# Patient Record
Sex: MALE | Race: WHITE | NOT HISPANIC OR LATINO | Employment: FULL TIME | URBAN - METROPOLITAN AREA
[De-identification: names, ages, dates, MRNs, and addresses within clinical notes are randomized per-mention and may not be internally consistent; named-entity substitution may affect disease eponyms.]

---

## 2021-07-02 ENCOUNTER — OFFICE VISIT (OUTPATIENT)
Dept: FAMILY MEDICINE CLINIC | Facility: CLINIC | Age: 41
End: 2021-07-02

## 2021-07-02 VITALS
WEIGHT: 151 LBS | BODY MASS INDEX: 22.88 KG/M2 | OXYGEN SATURATION: 98 % | SYSTOLIC BLOOD PRESSURE: 128 MMHG | HEART RATE: 88 BPM | HEIGHT: 68 IN | DIASTOLIC BLOOD PRESSURE: 66 MMHG

## 2021-07-02 DIAGNOSIS — Z13.29 SCREENING FOR ENDOCRINE DISORDER: ICD-10-CM

## 2021-07-02 DIAGNOSIS — Z13.220 SCREENING FOR HYPERLIPIDEMIA: ICD-10-CM

## 2021-07-02 DIAGNOSIS — Z13.0 SCREENING FOR DEFICIENCY ANEMIA: ICD-10-CM

## 2021-07-02 DIAGNOSIS — Z00.00 WELL ADULT EXAM: Primary | ICD-10-CM

## 2021-07-02 PROCEDURE — 99385 PREV VISIT NEW AGE 18-39: CPT | Performed by: FAMILY MEDICINE

## 2021-07-02 RX ORDER — MULTIPLE VITAMINS W/ MINERALS TAB 9MG-400MCG
1 TAB ORAL DAILY
COMMUNITY

## 2021-07-02 NOTE — PROGRESS NOTES
Subjective   Tyrone Sanchez is a 41 y.o. male.     Chief Complaint   Patient presents with   • [facility]       History of Present Illness     Tyrone Sanchez presents today for annual physical exam and preventative care.    The patient denies recently having blood work completed or having any chronic issues. He states that he has been staying active.     He reports that he had a yeast infection on his penis that returned approximately 6 months later, which concerned him about possible diabetes or an STD. He reports that he had an STD screening that returned negative. He states that his grandfather passed away from pancreatic cancer and his grandmother had diabetes. He denies fatigue, weight gain, weight loss, increased urination, increased hunger, or increased thirst. He also denies having vision changes or significant swelling in his ankles. He denies having chest tightness or shortness of breath. He denies having any swollen lymph nodes, bruising, or constipation. He states that he has had hemorrhoids that inflame approximately 1 to 2 times yearly, which typically heal when he eats fiber. He reports that his father also has them.     He is allergic to CATS, PENICILLIN, and CERTAIN TYPES OF GRASS. He reports he has never had allergies until he moved to the area. He denies taking any allergy medication currently. He has recently tried 5 tablets of Allegra for congestion; however, it did not seem to help him. He states that he takes a multivitamin.     He is originally from [place], [place]. He smokes marijuana via a joint daily for purely recreational purposes. 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 He uses the electronic form of marijuana. His father has a history of melanoma and thyroid cancer. He denies having any sexual dysfunction. 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    He is employed as a  at [employer]. 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 He states that he shares custody with his 4-year-old daughter's mother and his daughter lives with him half of the time. He has 1 dog at home and denies going to Orthodoxy. He states that he does go to the golf course and he feels better when he walks while he plays.     This patient is accompanied by their self who contributes to the history of their care.    The following portions of the patient's history were reviewed and updated as appropriate: allergies, current medications, past family history, past medical history, past social history, past surgical history and problem list.    Active Ambulatory Problems     Diagnosis Date Noted   • No Active Ambulatory Problems     Resolved Ambulatory Problems     Diagnosis Date Noted   • No Resolved Ambulatory Problems     No Additional Past Medical History     History reviewed. No pertinent surgical history.  Family History   Problem Relation Age of Onset   • Other Father    • Diabetes Maternal Grandfather    • Cancer Paternal Grandfather      Social History     Socioeconomic History   • Marital status: Single     Spouse name: Not on file   • Number of children: Not on file   • Years of education: Not on file   • Highest education level: Not on file   Tobacco Use   • Smoking status: Former Smoker     Quit date: 2017     Years since quittin.0   • Smokeless tobacco: Never Used   Vaping Use   • Vaping Use: Some days   • Substances: Nicotine   • Devices: Refillable tank   Substance and Sexual Activity   • Alcohol use: Yes     Alcohol/week: 2.0 standard drinks     Types: 2 Cans of beer per week     Comment: week   • Drug use: Yes     Types: Marijuana   • Sexual activity: Yes       Review of Systems   Constitutional: Negative.    HENT: Negative.    Eyes: Negative for visual disturbance.   Respiratory: Negative.    Cardiovascular: Negative.    Gastrointestinal:        Rare hemorrhoids   Endocrine: Negative for polydipsia,  "polyphagia and polyuria.   Genitourinary:        Recent yeast infection   Musculoskeletal: Negative.    Allergic/Immunologic: Positive for environmental allergies. Negative for food allergies.        Cats, certain types of grass   Hematological: Negative for adenopathy. Does not bruise/bleed easily.       Objective   Blood pressure 128/66, pulse 88, height 173.4 cm (68.25\"), weight 68.5 kg (151 lb), SpO2 98 %.  Nursing note reviewed  Physical Exam  HENT:      Ears:      Comments: Significant fluid behind his bilat eardrum      General: Patient is well-nourished, well-developed, and in no acute distress.  HEENT: Normocephalic with no contusions noted, no ptosis or palsy. Pupils equally round and reactive to light, extraocular movements intact. Patient holds steady gaze, can follow to midline. Hearing grossly normal without deficit, exterior auricles normal, tympanic membranes normal without erythema or bulging.  Lymphatic: Posterior auricular, cervical, submandibular, supraclavicular, axillary lymphatic sites palpated without abnormality  Cardiovascular: Normal study rate without ectopy. PMI palpated, normal. Normal S1, S2. No murmurs rubs or gallops.  Respiratory: No tenderness to palpation on the chest wall, lungs clear to auscultation bilaterally, no wheezes, rales, or rhonchi. No pleural friction rubs.  Gastrointestinal: Bowel sounds present, normoactive globally. No bruit noted. Nontender, nondistended. Normal percussive exam, no hepatomegaly, no splenomegaly. No hernias, scars, gross lesions.  Extremities: No cyanosis or edema, 2+ pulses bilaterally, reflexes normal. Capillary refill time normal.  MSK: Normal gait and station. 5/5 strength globally.  Neuro: Cranial nerves II-XII grossly intact. Patient alert and oriented x3.  PHQ-9 Depression Screening  Little interest or pleasure in doing things? 0   Feeling down, depressed, or hopeless? 0   Trouble falling or staying asleep, or sleeping too much?     Feeling " tired or having little energy?     Poor appetite or overeating?     Feeling bad about yourself - or that you are a failure or have let yourself or your family down?     Trouble concentrating on things, such as reading the newspaper or watching television?     Moving or speaking so slowly that other people could have noticed? Or the opposite - being so fidgety or restless that you have been moving around a lot more than usual?     Thoughts that you would be better off dead, or of hurting yourself in some way?     PHQ-9 Total Score 0   If you checked off any problems, how difficult have these problems made it for you to do your work, take care of things at home, or get along with other people?       Procedures  Assessment/Plan   Problem List Items Addressed This Visit     None      Visit Diagnoses     Well adult exam    -  Primary        1. Well adult exam.  - He will get fasting blood work at his [facility]. He does have a history of HSV-1; however, he denies having any cold sores. I informed him to contact me if he does have an outbreak of cold sores or another yeast infection and we will treat it accordingly. I recommended Flonase secondary to his allergies and the fluid buildup behind his eardrum. He will follow up in 1 year, or sooner if needed. I also recommended for him to decrease his smoke inhalation while utilizing marijuana.     See patient diagnoses and orders along with patient instructions for assessment, plan, and changes to care for patient.    The preventative exam has been reviewed in detail.  The patient has been fully counseled on preventative guidelines for vaccines, cancer screenings, and other health maintenance needs. The patient was counseled on maintaining a lifestyle to promote good health and to minimize chronic diseases.  The patient has been assisted with scheduling healthcare procedures for the coming year and given a written document outlining these recommendations. Age-appropriate  screening measures have been ordered for the patient today as indicated above.    There are no Patient Instructions on file for this visit.    Return in about 1 year (around 7/2/2022) for Annual.    Ambulatory progress note signed and attested to by Amari Edwards D.O.           Current Outpatient Medications:   •  CBD (cannabidiol) oral oil, Take  by mouth Daily., Disp: , Rfl:   •  multivitamin with minerals tablet tablet, Take 1 tablet by mouth Daily., Disp: , Rfl:        Scribed for Amari Edwards DO by Jorge L Melchor.  07/02/21   11:47 EDT    I have personally performed the services described in this document as scribed by the above individual, and it is both accurate and complete.  Amari Edwards DO  7/6/2021  09:40 EDT

## 2021-07-02 NOTE — PATIENT INSTRUCTIONS
Preventive Care 21-39 Years Old, Male  Preventive care refers to lifestyle choices and visits with your health care provider that can promote health and wellness. This includes:  · A yearly physical exam. This is also called an annual wellness visit.  · Regular dental and eye exams.  · Immunizations.  · Screening for certain conditions.  · Healthy lifestyle choices, such as:  ? Eating a healthy diet.  ? Getting regular exercise.  ? Not using drugs or products that contain nicotine and tobacco.  ? Limiting alcohol use.  What can I expect for my preventive care visit?  Physical exam  Your health care provider may check your:  · Height and weight. These may be used to calculate your BMI (body mass index). BMI is a measurement that tells if you are at a healthy weight.  · Heart rate and blood pressure.  · Body temperature.  · Skin for abnormal spots.  Counseling  Your health care provider may ask you questions about your:  · Past medical problems.  · Family's medical history.  · Alcohol, tobacco, and drug use.  · Emotional well-being.  · Home life and relationship well-being.  · Sexual activity.  · Diet, exercise, and sleep habits.  · Work and work environment.  · Access to firearms.  What immunizations do I need?    Vaccines are usually given at various ages, according to a schedule. Your health care provider will recommend vaccines for you based on your age, medical history, and lifestyle or other factors, such as travel or where you work.  What tests do I need?  Blood tests  · Lipid and cholesterol levels. These may be checked every 5 years starting at age 20.  · Hepatitis C test.  · Hepatitis B test.  Screening    · Diabetes screening. This is done by checking your blood sugar (glucose) after you have not eaten for a while (fasting).  · Genital exam to check for testicular cancer or hernias.  · STD (sexually transmitted disease) testing, if you are at risk.  Talk with your health care provider about your test  results, treatment options, and if necessary, the need for more tests.  Follow these instructions at home:  Eating and drinking    · Eat a healthy diet that includes fresh fruits and vegetables, whole grains, lean protein, and low-fat dairy products.  · Drink enough fluid to keep your urine pale yellow.  · Take vitamin and mineral supplements as recommended by your health care provider.  · Do not drink alcohol if your health care provider tells you not to drink.  · If you drink alcohol:  ? Limit how much you have to 0-2 drinks a day.  ? Be aware of how much alcohol is in your drink. In the [place] one drink equals one 12 oz bottle of beer (355 mL), one 5 oz glass of wine (148 mL), or one 1½ oz glass of hard liquor (44 mL).  Lifestyle  · Take daily care of your teeth and gums. Brush your teeth every morning and night with fluoride toothpaste. Floss one time each day.  · Stay active. Exercise for at least 30 minutes 5 or more days each week.  · Do not use any products that contain nicotine or tobacco, such as cigarettes, e-cigarettes, and chewing tobacco. If you need help quitting, ask your health care provider.  · Do not use drugs.  · If you are sexually active, practice safe sex. Use a condom or other form of protection to prevent STIs (sexually transmitted infections).  · Find healthy ways to cope with stress, such as:  ? Meditation, yoga, or listening to music.  ? Journaling.  ? Talking to a trusted person.  ? Spending time with friends and family.  Safety  · Always wear your seat belt while driving or riding in a vehicle.  · Do not drive:  ? If you have been drinking alcohol. Do not ride with someone who has been drinking.  ? When you are tired or distracted.  ? While texting.  · Wear a helmet and other protective equipment during sports activities.  · If you have firearms in your house, make sure you follow all gun safety procedures.  · Seek help if you have been physically or sexually abused.  What's next?  · Go  to your health care provider once a year for an annual wellness visit.  · Ask your health care provider how often you should have your eyes and teeth checked.  · Stay up to date on all vaccines.  This information is not intended to replace advice given to you by your health care provider. Make sure you discuss any questions you have with your health care provider.  Document Revised: 09/02/2020 Document Reviewed: 12/12/2019  [employer] Patient Education © 2021 [employer]

## 2021-09-02 ENCOUNTER — LAB (OUTPATIENT)
Dept: LAB | Facility: HOSPITAL | Age: 41
End: 2021-09-02

## 2021-09-02 ENCOUNTER — TELEPHONE (OUTPATIENT)
Dept: FAMILY MEDICINE CLINIC | Facility: CLINIC | Age: 41
End: 2021-09-02

## 2021-09-02 DIAGNOSIS — Z00.00 WELL ADULT EXAM: ICD-10-CM

## 2021-09-02 LAB
APPEARANCE UR: CLEAR
BILIRUB UR QL STRIP: NEGATIVE
COLOR UR: YELLOW
GLUCOSE UR QL: NEGATIVE
HGB UR QL STRIP: NEGATIVE
KETONES UR QL STRIP: NEGATIVE
LEUKOCYTE ESTERASE UR QL STRIP: NEGATIVE
NITRITE UR QL STRIP: NEGATIVE
PH UR STRIP: 6.5 [PH] (ref 5–8)
PROT UR QL STRIP: NEGATIVE
SP GR UR: 1.03 (ref 1–1.03)
UROBILINOGEN UR STRIP-MCNC: NORMAL MG/DL

## 2021-09-02 NOTE — TELEPHONE ENCOUNTER
Caller: Tyrone Sanchez    Relationship: Self    Best call back number: [phone]    What is the best time to reach you: ANYTIME    Who are you requesting to speak with (clinical staff, provider,  specific staff member): CLINICAL STAFF    What was the call regarding: PATIENT STATES THAT HE HAD LABS DONE TODAY. HE DOES NOT KNOW WHAT ALL HE IS BEING TESTED FOR AND WOULD LIKE TO HAVE SOMEONE GIVE HIM A CALL BACK WITH THAT INFORMATION.     Do you require a callback: YES

## 2021-09-02 NOTE — TELEPHONE ENCOUNTER
Attempted to contact, no answer left detailed message relaying which labs were checked and briefly summarized and advised that we will follow up once his labs are reviewed.  Advised him to call back with any questions.

## 2021-09-03 LAB
ALBUMIN SERPL-MCNC: 4.6 G/DL (ref 3.5–5.2)
ALBUMIN/GLOB SERPL: 2 G/DL
ALP SERPL-CCNC: 49 U/L (ref 39–117)
ALT SERPL-CCNC: 23 U/L (ref 1–41)
AST SERPL-CCNC: 23 U/L (ref 1–40)
BILIRUB SERPL-MCNC: 0.3 MG/DL (ref 0–1.2)
BUN SERPL-MCNC: 24 MG/DL (ref 6–20)
BUN/CREAT SERPL: 22.4 (ref 7–25)
CALCIUM SERPL-MCNC: 9.8 MG/DL (ref 8.6–10.5)
CHLORIDE SERPL-SCNC: 107 MMOL/L (ref 98–107)
CHOLEST SERPL-MCNC: 184 MG/DL (ref 0–200)
CO2 SERPL-SCNC: 25.2 MMOL/L (ref 22–29)
CREAT SERPL-MCNC: 1.07 MG/DL (ref 0.76–1.27)
ERYTHROCYTE [DISTWIDTH] IN BLOOD BY AUTOMATED COUNT: 11.8 % (ref 12.3–15.4)
GLOBULIN SER CALC-MCNC: 2.3 GM/DL
GLUCOSE SERPL-MCNC: 99 MG/DL (ref 65–99)
HBA1C MFR BLD: 5.3 % (ref 4.8–5.6)
HCT VFR BLD AUTO: 43.6 % (ref 37.5–51)
HDLC SERPL-MCNC: 52 MG/DL (ref 40–60)
HGB BLD-MCNC: 14.8 G/DL (ref 13–17.7)
LDLC SERPL CALC-MCNC: 124 MG/DL (ref 0–100)
MCH RBC QN AUTO: 29.8 PG (ref 26.6–33)
MCHC RBC AUTO-ENTMCNC: 33.9 G/DL (ref 31.5–35.7)
MCV RBC AUTO: 87.7 FL (ref 79–97)
PLATELET # BLD AUTO: 252 10*3/MM3 (ref 140–450)
POTASSIUM SERPL-SCNC: 5 MMOL/L (ref 3.5–5.2)
PROT SERPL-MCNC: 6.9 G/DL (ref 6–8.5)
RBC # BLD AUTO: 4.97 10*6/MM3 (ref 4.14–5.8)
SODIUM SERPL-SCNC: 143 MMOL/L (ref 136–145)
TRIGL SERPL-MCNC: 40 MG/DL (ref 0–150)
TSH SERPL DL<=0.005 MIU/L-ACNC: 1.06 UIU/ML (ref 0.27–4.2)
VLDLC SERPL CALC-MCNC: 8 MG/DL (ref 5–40)
WBC # BLD AUTO: 7.21 10*3/MM3 (ref 3.4–10.8)

## 2021-09-09 ENCOUNTER — TELEPHONE (OUTPATIENT)
Dept: FAMILY MEDICINE CLINIC | Facility: CLINIC | Age: 41
End: 2021-09-09

## 2021-09-09 NOTE — TELEPHONE ENCOUNTER
Caller: SanchezTyrone    Relationship: Self    Best call back number: [phone] (H)    Caller requesting test results: PATIENT    What test was performed: BLOOD WORK    When was the test performed: 9/2/21    Where was the test performed:   [facility]    Additional notes: PATIENT CALLED TO OBTAIN THE RESULTS OF HIS BLOOD WORK.       PATIENT HAS BEEN ADVISED TO PLEASE ALLOW 48 HOURS FOR OUR CLINICAL TEAM WILL FOLLOW UP ON THIS REQUEST.

## 2021-09-22 NOTE — TELEPHONE ENCOUNTER
Called results to patient. He declined a letter with physical copy of results at this time. His LDL is slightly high at 124 but not indicated for medication at this time. His urine and BUN suggest he should increase his water intake but otherwise his results were unremarkable. He will work on diet and exercise and water intake and follow up next year for his annual.

## 2021-10-15 ENCOUNTER — OFFICE VISIT (OUTPATIENT)
Dept: FAMILY MEDICINE CLINIC | Facility: CLINIC | Age: 41
End: 2021-10-15

## 2021-10-15 ENCOUNTER — HOSPITAL ENCOUNTER (OUTPATIENT)
Dept: RADIOLOGY | Facility: CLINIC | Age: 41
Discharge: HOME | End: 2021-10-15

## 2021-10-15 VITALS
TEMPERATURE: 97.1 F | HEART RATE: 98 BPM | WEIGHT: 152.6 LBS | BODY MASS INDEX: 23.13 KG/M2 | SYSTOLIC BLOOD PRESSURE: 108 MMHG | OXYGEN SATURATION: 94 % | HEIGHT: 68 IN | RESPIRATION RATE: 18 BRPM | DIASTOLIC BLOOD PRESSURE: 62 MMHG

## 2021-10-15 DIAGNOSIS — S61.451A DOG BITE OF RIGHT HAND, INITIAL ENCOUNTER: ICD-10-CM

## 2021-10-15 DIAGNOSIS — M20.011 MALLET FINGER OF RIGHT HAND: Primary | ICD-10-CM
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
## 2021-10-15 DIAGNOSIS — S63.296A DISLOCATION OF DISTAL INTERPHALANGEAL (DIP) JOINT OF RIGHT LITTLE FINGER, INITIAL ENCOUNTER: ICD-10-CM

## 2021-10-15 PROCEDURE — 73130 X-RAY EXAM OF HAND: CPT | Performed by: FAMILY MEDICINE

## 2021-10-15 PROCEDURE — 90715 TDAP VACCINE 7 YRS/> IM: CPT | Performed by: FAMILY MEDICINE

## 2021-10-15 PROCEDURE — 90471 IMMUNIZATION ADMIN: CPT | Performed by: FAMILY MEDICINE

## 2021-10-15 PROCEDURE — 99215 OFFICE O/P EST HI 40 MIN: CPT | Performed by: FAMILY MEDICINE

## 2021-10-15 PROCEDURE — 99417 PROLNG OP E/M EACH 15 MIN: CPT | Performed by: FAMILY MEDICINE

## 2021-10-15 RX ORDER — CLINDAMYCIN HYDROCHLORIDE 300 MG/1
300 CAPSULE ORAL 3 TIMES DAILY
Qty: 21 CAPSULE | Refills: 0 | Status: SHIPPED | OUTPATIENT
Start: 2021-10-15 | End: 2021-10-22

## 2021-10-15 NOTE — PROGRESS NOTES
Subjective   Tyrone Sanchez is a 41 y.o. male.     Chief Complaint   Patient presents with   • Hand Pain     breaking up dog fight       History of Present Illness     Tyrone Sanchez presents today for   Chief Complaint   Patient presents with   • Hand Pain     breaking up dog fight     The patient presents today for an acute same day visit regarding hand pain.    He states late last night he broke up a dog fight and his little finger was punctured. The patient denies any pain. He is able to bend his finger, but he is unable to straighten it. He has not had any x-rays yet.    This patient is accompanied by their self who contributes to the history of their care.    The following portions of the patient's history were reviewed and updated as appropriate: allergies, current medications, past family history, past medical history, past social history, past surgical history and problem list.    Active Ambulatory Problems     Diagnosis Date Noted   • No Active Ambulatory Problems     Resolved Ambulatory Problems     Diagnosis Date Noted   • No Resolved Ambulatory Problems     Past Medical History:   Diagnosis Date   • Herpes simplex antibody positive      History reviewed. No pertinent surgical history.  Family History   Problem Relation Age of Onset   • Other Father    • Diabetes Maternal Grandfather    • Cancer Paternal Grandfather      Social History     Socioeconomic History   • Marital status: Single   Tobacco Use   • Smoking status: Former Smoker     Quit date: 2017     Years since quittin.3   • Smokeless tobacco: Never Used   Vaping Use   • Vaping Use: Some days   • Substances: Nicotine   • Devices: Refillable tank   • Passive vaping exposure: Yes   Substance and Sexual Activity   • Alcohol use: Yes     Alcohol/week: 2.0 standard drinks     Types: 2 Cans of beer per week     Comment: week   • Drug use: Yes     Types: Marijuana   • Sexual activity: Yes       Review of Systems  See HPI    Objective   Blood  "pressure 108/62, pulse 98, temperature 97.1 °F (36.2 °C), resp. rate 18, height 173.4 cm (68.27\"), weight 69.2 kg (152 lb 9.6 oz), SpO2 94 %.  Nursing note reviewed  Physical Exam  Musculoskeletal:      Comments: There is a 30-degree radial angulation at the DIP of the right 5th digit and it is stuck in approximately 60 degrees of flexion. Passive extension does not elicit any pain, but he has no active extension. There is a deformity at the dorsal PIP over the 5th digit.       Const: NAD, A&Ox4, Pleasant, Cooperative  Eyes: EOMI, no conjunctivitis  ENT: No nasal discharge present, neck supple  Cardiac: Regular rate and rhythm, no cyanosis  Resp: Respiratory rate within normal limits, no increased work of breathing, no audible wheezing or retractions noted  GI: No distention or ascites  MSK: Motor and sensation grossly intact in bilateral upper extremities  Neurologic: CN II-XII grossly intact  Psych: Appropriate mood and behavior.  Skin: Warm, dry  Procedures   XR Hand 3+ View Right    Result Date: 10/16/2021  Flexion deformity of the fifth DIP joint as described, and slightly irregular appearance of the trabecular pattern of the fifth middle phalanx. No definite fracture line. No evidence of foreign body.   D:  10/15/2021 E:  10/15/2021  This report was finalized on 10/16/2021 10:31 PM by Dr. Jonah Foley MD.      Assessment/Plan   Problem List Items Addressed This Visit     None      Visit Diagnoses     Mallet finger of right hand    -  Primary    No fracture evident. Splinted in DIP extension. Will follow up with hand surgery.    Relevant Orders    XR Hand 3+ View Right (Completed)    Ambulatory Referral to Hand Surgery    Dog bite of right hand, initial encounter        Tdap given today. No cellulitis evident on exam today, no wound closure indicated especially since >12 hours old, but would recommend abx prophylaxis.    Relevant Medications    clindamycin (Cleocin) 300 MG capsule    Other Relevant Orders    Tdap " Vaccine Greater Than or Equal To 8yo IM (Completed)          1. Dislocation of the right 5th DIP  -  It happened last night while trying to break up a dog fight. He has no pain on exam today, but severe deformity. He will have an x-ray and further management pending for results of this.    Patient Instructions   1.  Wear brace for 6 weeks, at least until your follow up with orthopedics.    2.  Avoid use of the right hand when possible.      Return in about 2 weeks (around 10/29/2021) for or with hand surgery.    Counseling was given to patient and family for the following topics: diagnostic results, instructions for management, patient and family education, impressions and importance of treatment compliance . Total time of the encounter including pre-visit review, history, physical, counseling, , care coordination, and documentation on day of service was 55 minutes.    Ambulatory progress note signed and attested to by Amari Edwards D.O.         Transcribed from ambient dictation for Amari Edwards DO by Samira Correa.  10/15/21   15:20 EDT    I have personally performed the services described in this document as transcribed by the above individual, and it is both accurate and complete.  Amari Edwards DO  10/20/2021  15:18 EDT

## 2021-10-15 NOTE — PATIENT INSTRUCTIONS
1.  Wear brace for 6 weeks, at least until your follow up with orthopedics.    2.  Avoid use of the right hand when possible.

## 2021-10-26 ENCOUNTER — TELEPHONE (OUTPATIENT)
Dept: FAMILY MEDICINE CLINIC | Facility: CLINIC | Age: 41
End: 2021-10-26

## 2021-10-26 DIAGNOSIS — M20.011 MALLET FINGER OF RIGHT HAND: Primary | ICD-10-CM

## 2021-10-26 NOTE — TELEPHONE ENCOUNTER
Called pt. Stated he isnt scheduled to see a Hand surgeon until the 8th of Nov. Stated he got the impression that he needs to be seen more urgently than this. Stated he also has noticed his hand is starting to ache and it wasn't doing this before. States he has heard good things about [facility] however he doesn't care, just wants whomever has the soonest appt date.

## 2021-10-26 NOTE — TELEPHONE ENCOUNTER
Caller: Tyrone Sanchez    Relationship: Self    Best call back number:[phone]    What is the best time to reach you: ANYTIME    Who are you requesting to speak with (clinical staff, provider,  specific staff member): DR. BASS    What was the call regarding: PATIENT STATES THAT HE IS NOT ABLE TO GET IN FOR HAND SURGERY UNTIL November 8 AND WOULD LIKE DISCUSS A NEW REFERRAL    Do you require a callback: YES

## 2021-10-26 NOTE — TELEPHONE ENCOUNTER
As long as patient is wearing the splint at all times, extremely urgent followup is not needed but I will try to get him into [facility] sooner.

## 2021-10-26 NOTE — TELEPHONE ENCOUNTER
Called and informed pt of providers orders as noted below. Pt voiced understanding. Had no further questions/concerns at this time.

## 2021-11-01 ENCOUNTER — TELEPHONE (OUTPATIENT)
Dept: FAMILY MEDICINE CLINIC | Facility: CLINIC | Age: 41
End: 2021-11-01

## 2021-11-01 NOTE — TELEPHONE ENCOUNTER
PATIENT IS REQUESTING IF HE CAN GET A REPLACEMENT SPLINT MADE FOR HIS PINKY FINGER. PATIENT STATES PADDING IS WORN OUT OF PREVIOUS SPLINT.  PREVIOUS SPLINT WAS MADE BY DR. BASS.    PLEASE ADVISE    CALL BACK NUMBER [phone]

## 2021-11-01 NOTE — TELEPHONE ENCOUNTER
I will forward this request to Dr. Edwards so that he can review it. He will be back in the office tomorrow.

## 2021-11-02 NOTE — TELEPHONE ENCOUNTER
My message was intended for the pool for a replacement splint. Yes, he can come in for a replacement. See above when he does.

## 2021-11-02 NOTE — TELEPHONE ENCOUNTER
Called and spoke to pt. Informed of providers orders as noted below. Pt verbalized good understanding. Stated he really needs another splint because the padding has worn out of his. Stated he is being careful and dressing it/changing it out but after 3 weeks it has worn down and the padding is almost gone. Stated he doesn't mind to cut it to size, just needs another one because he's worn this one out.

## 2021-11-02 NOTE — TELEPHONE ENCOUNTER
It's a regular aluminum splint I just cut it to size. It is imperative when replacing the splint the his finger not bend (brace on counter edge, remove, replace underneath, then tape)

## 2021-11-03 NOTE — TELEPHONE ENCOUNTER
Spoke with pt's PCP regarding the splint. Splint is on my desk with pt's information attached. Notified patient that he can pick it up. Pt states he will be in tomorrow.

## 2021-12-08 ENCOUNTER — OFFICE VISIT (OUTPATIENT)
Dept: FAMILY MEDICINE CLINIC | Facility: CLINIC | Age: 41
End: 2021-12-08

## 2021-12-08 VITALS
HEIGHT: 68 IN | OXYGEN SATURATION: 96 % | HEART RATE: 84 BPM | DIASTOLIC BLOOD PRESSURE: 72 MMHG | RESPIRATION RATE: 18 BRPM | BODY MASS INDEX: 24.46 KG/M2 | WEIGHT: 161.4 LBS | SYSTOLIC BLOOD PRESSURE: 112 MMHG

## 2021-12-08 DIAGNOSIS — H00.015 HORDEOLUM EXTERNUM OF LEFT LOWER EYELID: Primary | ICD-10-CM

## 2021-12-08 PROCEDURE — 99213 OFFICE O/P EST LOW 20 MIN: CPT | Performed by: FAMILY MEDICINE

## 2021-12-08 RX ORDER — SULFACETAMIDE SODIUM 100 MG/ML
1 SOLUTION/ DROPS OPHTHALMIC
Qty: 5 ML | Refills: 0 | Status: SHIPPED | OUTPATIENT
Start: 2021-12-08 | End: 2023-01-09

## 2021-12-08 NOTE — PROGRESS NOTES
Subjective   Tyrone Sanchez is a 41 y.o. male.     Chief Complaint   Patient presents with   • stai on eye     recurrent       History of Present Illness     Tyrone Sanchez presents today for   Chief Complaint   Patient presents with   • stai on eye     recurrent     The patient reports a reoccurring lesion on the edge of his left eyelid, gradually grew in size and then popped on its own. He tried warm compresses, and cleaning his eyelids at night with saline solution. He reports that the lesion has returned 3 times and twice within the last 6 months. He does rub his eyes frequently during allergy season. He denies wearing contacts.     He reports that he does scratch his eyes a lot during the allergy season.    This patient is accompanied by their self who contributes to the history of their care.    The following portions of the patient's history were reviewed and updated as appropriate: allergies, current medications, past family history, past medical history, past social history, past surgical history and problem list.    Active Ambulatory Problems     Diagnosis Date Noted   • No Active Ambulatory Problems     Resolved Ambulatory Problems     Diagnosis Date Noted   • No Resolved Ambulatory Problems     Past Medical History:   Diagnosis Date   • Herpes simplex antibody positive      History reviewed. No pertinent surgical history.  Family History   Problem Relation Age of Onset   • Other Father    • Diabetes Maternal Grandfather    • Cancer Paternal Grandfather      Social History     Socioeconomic History   • Marital status: Single   Tobacco Use   • Smoking status: Former Smoker     Packs/day: 1.00     Years: 20.00     Pack years: 20.00     Start date:      Quit date: 2017     Years since quittin.4   • Smokeless tobacco: Never Used   Vaping Use   • Vaping Use: Some days   • Substances: Nicotine   • Devices: Refillable tank   • Passive vaping exposure: Yes   Substance and Sexual Activity   • Alcohol use:  "Yes     Alcohol/week: 2.0 standard drinks     Types: 2 Cans of beer per week     Comment: week   • Drug use: Yes     Types: Marijuana   • Sexual activity: Yes       Review of Systems  See HPI    Objective   Blood pressure 112/72, pulse 84, resp. rate 18, height 173.4 cm (68.27\"), weight 73.2 kg (161 lb 6.4 oz), SpO2 96 %.  Nursing note reviewed  Physical Exam  Const: NAD, A&Ox4, Pleasant, Cooperative  Eyes: EOMI, no conjunctivitis  ENT: No nasal discharge present, neck supple  Cardiac: Regular rate and rhythm, no cyanosis  Resp: Respiratory rate within normal limits, no increased work of breathing, no audible wheezing or retractions noted  GI: No distention or ascites  MSK: Motor and sensation grossly intact in bilateral upper extremities  Neurologic: CN II-XII grossly intact  Psych: Appropriate mood and behavior.  Skin: Warm, dry  Procedures  Assessment/Plan   Problem List Items Addressed This Visit     None      Visit Diagnoses     Hordeolum externum of left lower eyelid    -  Primary    Relevant Medications    sulfacetamide (Bleph-10) 10 % ophthalmic solution          - We discussed prevention and treatment. I recommended that he use allergy eye drops in the summer and late fall if he is having to itch his eyes a lot. I recommended that he wash his pillow cases once per week, wash his pillows once per month. He will use eye drops as needed at the first sign of recurrence, and let me know if it recurs more than twice in 6 months.    There are no Patient Instructions on file for this visit.    Return in about 1 year (around 12/8/2022) for Annual.    [facility]     Ambulatory progress note signed and attested to by Amari Edwards D.O.         Transcribed from ambient dictation for Amari Edwards,  by Caitie Lawton.  12/08/21   11:58 EST    Patient verbalized consent to the visit recording.  I have personally performed the services described in this document as transcribed by the above individual, and it is both " accurate and complete.  Amari Edwards DO  12/16/2021  14:40 EST

## 2023-01-09 ENCOUNTER — OFFICE VISIT (OUTPATIENT)
Dept: FAMILY MEDICINE CLINIC | Facility: CLINIC | Age: 43
End: 2023-01-09
Payer: MEDICAID

## 2023-01-09 VITALS
BODY MASS INDEX: 23.61 KG/M2 | TEMPERATURE: 97.8 F | OXYGEN SATURATION: 97 % | WEIGHT: 155.8 LBS | SYSTOLIC BLOOD PRESSURE: 110 MMHG | DIASTOLIC BLOOD PRESSURE: 80 MMHG | HEIGHT: 68 IN | HEART RATE: 98 BPM

## 2023-01-09 DIAGNOSIS — M54.50 ACUTE MIDLINE LOW BACK PAIN WITHOUT SCIATICA: Primary | ICD-10-CM

## 2023-01-09 PROCEDURE — 99213 OFFICE O/P EST LOW 20 MIN: CPT | Performed by: NURSE PRACTITIONER

## 2023-01-09 RX ORDER — CYCLOBENZAPRINE HCL 10 MG
10 TABLET ORAL 3 TIMES DAILY PRN
Qty: 90 TABLET | Refills: 0 | Status: SHIPPED | OUTPATIENT
Start: 2023-01-09

## 2023-01-09 RX ORDER — PREDNISONE 20 MG/1
40 TABLET ORAL DAILY
Qty: 14 TABLET | Refills: 0 | Status: SHIPPED | OUTPATIENT
Start: 2023-01-09

## 2023-01-09 NOTE — PROGRESS NOTES
Chief Complaint   Patient presents with   • Back Pain     Lower to Mid back.. Patient states he was golfing and swung the club to where it dung into the ground/dirt. Started 3 days ago        Subjective   Tyrone Sanchez is a 43 y.o. male    History of Present Illness  Patient today with complaints of back pain.  He was swinging a club at a indoor golf facility on this past Thursday and when he does again he developed excruciating low to mid back pain.  He has tried heat, Epson salts and ibuprofen 800 mg 3-4 times a day.  He has seen minimal help with his pain.  He denies any change in bowels or bladder.    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 No pertinent surgical history.  Social History     Socioeconomic History   • Marital status: Single   Tobacco Use   • Smoking status: Former     Packs/day: 1.00     Years: 20.00     Pack years: 20.00     Types: Cigarettes     Start date:      Quit date: 2017     Years since quittin.5   • Smokeless tobacco: Never   Vaping Use   • Vaping Use: Some days   • Substances: Nicotine   • Devices: Refillable tank   • Passive vaping exposure: Yes   Substance and Sexual Activity   • Alcohol use: Yes     Alcohol/week: 2.0 standard drinks     Types: 2 Cans of beer per week     Comment: week   • Drug use: Yes     Types: Marijuana   • Sexual activity: Yes        Current Outpatient Medications:   •  multivitamin with minerals tablet tablet, Take 1 tablet by mouth Daily., Disp: , Rfl:   •  cyclobenzaprine (FLEXERIL) 10 MG tablet, Take 1 tablet by mouth 3 (Three) Times a Day As Needed for Muscle Spasms., Disp: 90 tablet, Rfl: 0  •  predniSONE (DELTASONE) 20 MG tablet, Take 2 tablets by mouth Daily., Disp: 14 tablet, Rfl: 0     Review of Systems   Constitutional: Negative for chills, fatigue, fever and unexpected weight change.    Respiratory: Negative for cough, chest tightness, shortness of breath and wheezing.    Cardiovascular: Negative for chest pain, palpitations and leg swelling.   Gastrointestinal: Negative for constipation, diarrhea, nausea and vomiting.   Genitourinary: Negative for difficulty urinating and dysuria.   Musculoskeletal: Positive for back pain.   Skin: Negative for color change and rash.   Neurological: Negative for dizziness, syncope, weakness and headaches.   Psychiatric/Behavioral: Negative for sleep disturbance.       Objective     Vitals:    01/09/23 0937   BP: 110/80   Pulse: 98   Temp: 97.8 °F (36.6 °C)   SpO2: 97%         01/09/23 0937   Weight: 70.7 kg (155 lb 12.8 oz)     Body mass index is 23.5 kg/m².  Results for orders placed or performed in visit on 09/02/21   TSH    Specimen: Blood    BLOOD  RELEASE TO NIDIA   Result Value Ref Range    TSH 1.060 0.270 - 4.200 uIU/mL   Urinalysis With Microscopic If Indicated (No Culture) - Urine, Clean Catch    Specimen: Urine, Clean Catch    URINE  RELEASE TO NIDIA   Result Value Ref Range    Specific Gravity, UA 1.026 1.005 - 1.030    pH, UA 6.5 5.0 - 8.0    Color, UA Yellow     Appearance, UA Clear Clear    Leukocytes, UA Negative Negative    Protein Negative Negative    Glucose, UA Negative Negative    Ketones Negative Negative    Blood, UA Negative Negative    Bilirubin, UA Negative Negative    Urobilinogen, UA Comment     Nitrite, UA Negative Negative   Comprehensive Metabolic Panel    Specimen: Blood    BLOOD  RELEASE TO NIDIA   Result Value Ref Range    Glucose 99 65 - 99 mg/dL    BUN 24 (H) 6 - 20 mg/dL    Creatinine 1.07 0.76 - 1.27 mg/dL    eGFR Non African Am 77 >60 mL/min/1.73    eGFR African Am 93 >60 mL/min/1.73    BUN/Creatinine Ratio 22.4 7.0 - 25.0    Sodium 143 136 - 145 mmol/L    Potassium 5.0 3.5 - 5.2 mmol/L    Chloride 107 98 - 107 mmol/L    Total CO2 25.2 22.0 - 29.0 mmol/L    Calcium 9.8 8.6 - 10.5 mg/dL    Total Protein 6.9 6.0 - 8.5 g/dL     Albumin 4.60 3.50 - 5.20 g/dL    Globulin 2.3 gm/dL    A/G Ratio 2.0 g/dL    Total Bilirubin 0.3 0.0 - 1.2 mg/dL    Alkaline Phosphatase 49 39 - 117 U/L    AST (SGOT) 23 1 - 40 U/L    ALT (SGPT) 23 1 - 41 U/L   Hemoglobin A1c    Specimen: Blood    BLOOD  RELEASE TO NIDIA   Result Value Ref Range    Hemoglobin A1C 5.30 4.80 - 5.60 %   Lipid Panel    Specimen: Blood    BLOOD  RELEASE TO NIDIA   Result Value Ref Range    Total Cholesterol 184 0 - 200 mg/dL    Triglycerides 40 0 - 150 mg/dL    HDL Cholesterol 52 40 - 60 mg/dL    VLDL Cholesterol Yvon 8 5 - 40 mg/dL    LDL Chol Calc (NIH) 124 (H) 0 - 100 mg/dL   CBC (No Diff)    Specimen: Blood    BLOOD  RELEASE TO NIDIA   Result Value Ref Range    WBC 7.21 3.40 - 10.80 10*3/mm3    RBC 4.97 4.14 - 5.80 10*6/mm3    Hemoglobin 14.8 13.0 - 17.7 g/dL    Hematocrit 43.6 37.5 - 51.0 %    MCV 87.7 79.0 - 97.0 fL    MCH 29.8 26.6 - 33.0 pg    MCHC 33.9 31.5 - 35.7 g/dL    RDW 11.8 (L) 12.3 - 15.4 %    Platelets 252 140 - 450 10*3/mm3       Physical Exam  Vitals reviewed.   Constitutional:       Appearance: He is well-developed.   HENT:      Head: Normocephalic and atraumatic.   Cardiovascular:      Rate and Rhythm: Normal rate and regular rhythm.      Heart sounds: Normal heart sounds.   Pulmonary:      Effort: Pulmonary effort is normal.      Breath sounds: Normal breath sounds.   Musculoskeletal:         General: Tenderness (low back) present.   Skin:     General: Skin is warm and dry.   Neurological:      Mental Status: He is alert and oriented to person, place, and time.   Psychiatric:         Behavior: Behavior normal.         Assessment & Plan   Problems Addressed this Visit    None  Visit Diagnoses     Acute midline low back pain without sciatica    -  Primary    Relevant Medications    predniSONE (DELTASONE) 20 MG tablet    cyclobenzaprine (FLEXERIL) 10 MG tablet      Diagnoses       Codes Comments    Acute midline low back pain without sciatica    -  Primary ICD-10-CM:  M54.50  ICD-9-CM: 724.2       I instructed him to use prednisone 40 mg every morning for 1 week and not take ibuprofen during this time.  He is also use Flexeril 10 mg up to 3 times a day as needed and he said not do this and drive or drink alcohol.  Patient was encouraged to keep me informed of any acute changes, lack of improvement, or any new concerning symptoms.  If patient becomes concerned, or has any worsening symptoms, they are to report either here, urgent treatment, or emergency room. Plan of care discussed with pt. They verbalized understanding and agreement.     Time spent on visit, including counseling, education, reviewing the chart, and any recent test results, was   10     Minutes    Return visit in 2 weeks    Counseling provided on Back exercise.    Tom Felix, APRN   1/9/2023   10:25 EST     Please note that portions of this document were completed with a voice recognition program.     At [facility], we believe that sharing information builds trust and better relationships. You are receiving this note because you are receiving care at [facility] or have recently visited. It is possible you will see health information before a provider has talked with you about it. This kind of information can be easy to misunderstand. To help you fully understand what it means for your health, we urge you to discuss this note with your provider.

## 2023-08-24 ENCOUNTER — PRIOR AUTHORIZATION (OUTPATIENT)
Dept: FAMILY MEDICINE CLINIC | Facility: CLINIC | Age: 43
End: 2023-08-24

## 2023-08-24 ENCOUNTER — OFFICE VISIT (OUTPATIENT)
Dept: FAMILY MEDICINE CLINIC | Facility: CLINIC | Age: 43
End: 2023-08-24
Payer: MEDICAID

## 2023-08-24 ENCOUNTER — LAB (OUTPATIENT)
Dept: LAB | Facility: HOSPITAL | Age: 43
End: 2023-08-24
Payer: MEDICAID

## 2023-08-24 VITALS
SYSTOLIC BLOOD PRESSURE: 110 MMHG | DIASTOLIC BLOOD PRESSURE: 76 MMHG | BODY MASS INDEX: 22.13 KG/M2 | HEIGHT: 68 IN | OXYGEN SATURATION: 98 % | HEART RATE: 60 BPM | WEIGHT: 146 LBS

## 2023-08-24 DIAGNOSIS — Z13.29 SCREENING FOR ENDOCRINE DISORDER: ICD-10-CM

## 2023-08-24 DIAGNOSIS — Z13.220 SCREENING FOR HYPERLIPIDEMIA: ICD-10-CM

## 2023-08-24 DIAGNOSIS — Z51.81 THERAPEUTIC DRUG MONITORING: ICD-10-CM

## 2023-08-24 DIAGNOSIS — R10.84 GENERALIZED ABDOMINAL PAIN: Primary | ICD-10-CM

## 2023-08-24 LAB
ALBUMIN SERPL-MCNC: 4.8 G/DL (ref 3.5–5.2)
ALBUMIN/GLOB SERPL: 2.2 G/DL
ALP SERPL-CCNC: 55 U/L (ref 39–117)
ALT SERPL-CCNC: 14 U/L (ref 1–41)
APPEARANCE UR: CLEAR
AST SERPL-CCNC: 15 U/L (ref 1–40)
BILIRUB SERPL-MCNC: 0.3 MG/DL (ref 0–1.2)
BILIRUB UR QL STRIP: NEGATIVE
BUN SERPL-MCNC: 15 MG/DL (ref 6–20)
BUN/CREAT SERPL: 13.9 (ref 7–25)
CALCIUM SERPL-MCNC: 9.8 MG/DL (ref 8.6–10.5)
CHLORIDE SERPL-SCNC: 104 MMOL/L (ref 98–107)
CHOLEST SERPL-MCNC: 185 MG/DL (ref 0–200)
CO2 SERPL-SCNC: 26.7 MMOL/L (ref 22–29)
COLOR UR: YELLOW
CREAT SERPL-MCNC: 1.08 MG/DL (ref 0.76–1.27)
EGFRCR SERPLBLD CKD-EPI 2021: 88.4 ML/MIN/1.73
GLOBULIN SER CALC-MCNC: 2.2 GM/DL
GLUCOSE SERPL-MCNC: 106 MG/DL (ref 65–99)
GLUCOSE UR QL STRIP: NEGATIVE
HBA1C MFR BLD: 5.6 % (ref 4.8–5.6)
HDLC SERPL-MCNC: 47 MG/DL (ref 40–60)
HGB UR QL STRIP: NEGATIVE
KETONES UR QL STRIP: NEGATIVE
LDLC SERPL CALC-MCNC: 129 MG/DL (ref 0–100)
LEUKOCYTE ESTERASE UR QL STRIP: NEGATIVE
NITRITE UR QL STRIP: NEGATIVE
PH UR STRIP: 6 [PH] (ref 5–8)
POTASSIUM SERPL-SCNC: 5.4 MMOL/L (ref 3.5–5.2)
PROT SERPL-MCNC: 7 G/DL (ref 6–8.5)
PROT UR QL STRIP: NEGATIVE
SODIUM SERPL-SCNC: 141 MMOL/L (ref 136–145)
SP GR UR STRIP: 1.02 (ref 1–1.03)
TRIGL SERPL-MCNC: 49 MG/DL (ref 0–150)
TSH SERPL DL<=0.005 MIU/L-ACNC: 0.62 UIU/ML (ref 0.27–4.2)
UROBILINOGEN UR STRIP-MCNC: NORMAL MG/DL
VLDLC SERPL CALC-MCNC: 9 MG/DL (ref 5–40)

## 2023-08-24 RX ORDER — OMEPRAZOLE 40 MG/1
40 CAPSULE, DELAYED RELEASE ORAL 2 TIMES DAILY
Qty: 30 CAPSULE | Refills: 0 | Status: SHIPPED | OUTPATIENT
Start: 2023-08-24

## 2023-08-24 NOTE — PROGRESS NOTES
Established Patient Office Visit      Patient Name: Tyrone Sanchez  : 1980   MRN: [id number]   Care Team: Patient Care Team:  Amari Edwards DO as PCP - General (Family Medicine)    Chief Complaint:    Chief Complaint   Patient presents with    Abdominal Pain     Pain in the mornings and after meals. He states that his dad has IBS and he wants to get referred or see if he needs to be on a special diet.       History of Present Illness: Tyrone Sanchez is a 43 y.o. male who is here today for chief complaint.    HPI    This patient is accompanied by their self who contributes to the history of their care.    The following portions of the patient's history were reviewed and updated as appropriate: allergies, current medications, past family history, past medical history, past social history, past surgical history and problem list.    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    Family History:   Family History   Problem Relation Age of Onset    Other Father     Diabetes Maternal Grandfather     Cancer Paternal Grandfather        Social History:   Social History     Socioeconomic History    Marital status: Single   Tobacco Use    Smoking status: Former     Packs/day: 1.00     Years: 20.00     Pack years: 20.00     Types: Cigarettes     Start date:      Quit date: 2017     Years since quittin.1    Smokeless tobacco: Never   Vaping Use    Vaping Use: Some days    Substances: Nicotine    Devices: Refillable tank    Passive vaping exposure: Yes   Substance and Sexual Activity    Alcohol use: Yes     Alcohol/week: 2.0 standard drinks     Types: 2 Cans of beer per week     Comment: week    Drug use: Yes     Types: Marijuana    Sexual activity: Yes       Tobacco History:   Social History  "    Tobacco Use   Smoking Status Former    Packs/day: 1.00    Years: 20.00    Pack years: 20.00    Types: Cigarettes    Start date:     Quit date: 2017    Years since quittin.1   Smokeless Tobacco Never       Medications:     Current Outpatient Medications:     multivitamin with minerals tablet tablet, Take 1 tablet by mouth Daily., Disp: , Rfl:     omeprazole (priLOSEC) 40 MG capsule, Take 1 capsule by mouth 2 (Two) Times a Day., Disp: 30 capsule, Rfl: 0    Allergies:   Allergies   Allergen Reactions    Penicillins Anaphylaxis       Objective   Objective     Physical Exam:  Vital Signs:   Vitals:    23 0840   BP: 110/76   Pulse: 60   SpO2: 98%   Weight: 66.2 kg (146 lb)   Height: 173.4 cm (68.27\")     Body mass index is 22.03 kg/m².     Physical Exam  Nursing note reviewed  Const: NAD, A&Ox4, Pleasant, Cooperative  Eyes: EOMI, no conjunctivitis  ENT: No nasal discharge present, neck supple  Cardiac: Regular rate and rhythm, no cyanosis  Resp: Respiratory rate within normal limits, no increased work of breathing, no audible wheezing or retractions noted  GI: No distention or ascites. Firm mass right side  Procedures/Radiology     Procedures  No radiology results for the last 7 days     Assessment & Plan   Assessment / Plan      Assessment/Plan:   Problems Addressed This Visit  Diagnoses and all orders for this visit:    1. Generalized abdominal pain (Primary)  -     omeprazole (priLOSEC) 40 MG capsule; Take 1 capsule by mouth 2 (Two) Times a Day.  Dispense: 30 capsule; Refill: 0    2. Screening for hyperlipidemia  -     Lipid Panel; Future    3. Therapeutic drug monitoring    4. Screening for endocrine disorder  -     Hemoglobin A1c; Future  -     Comprehensive Metabolic Panel; Future  -     Urinalysis With Microscopic If Indicated (No Culture) - Urine, Clean Catch; Future  -     TSH; Future      Problem List Items Addressed This Visit    None  Visit Diagnoses       Generalized abdominal pain    -  " Primary    Relevant Medications    omeprazole (priLOSEC) 40 MG capsule    Screening for hyperlipidemia        Relevant Orders    Lipid Panel (Completed)    Therapeutic drug monitoring        Screening for endocrine disorder        Relevant Orders    Hemoglobin A1c (Completed)    Comprehensive Metabolic Panel (Completed)    Urinalysis With Microscopic If Indicated (No Culture) - Urine, Clean Catch (Completed)    TSH (Completed)          Empirical therapy is recommended I recommended high-dose proton pump inhibitor for 2 months to document complete resolution of symptoms with acid suppressive therapy.  After symptoms have been clearly eliminated with twice daily dosing of a proton pump inhibitor, omeprazole 40 mg p.o. twice daily, he will decrease this to once daily dosing and after a month of this will taper off.  Depending on how his symptoms recur following this, consideration of upper endoscopy to rule out erosive esophagitis or eosinophilic esophagitis I think would be in order.  I reassured him that high-dose proton pump inhibitor particularly in short-term is safe.     There are no Patient Instructions on file for this visit.    Follow Up:   No follow-ups on file.      DO ROSANNE Watts RD  [facility]  [id number]  [facility] [id number]  Fax [phone]  Phone [phone]

## 2023-08-25 NOTE — TELEPHONE ENCOUNTER
Approved on August 24  The request has been approved. The authorization is effective from 08/24/2023 to 08/23/2024, as long as the member is enrolled in their current health plan. The request was approved with a quantity restriction. This has been approved for a max daily dosage of 2. A written notification letter will follow with additional details.

## 2023-09-08 ENCOUNTER — TELEMEDICINE (OUTPATIENT)
Dept: FAMILY MEDICINE CLINIC | Facility: CLINIC | Age: 43
End: 2023-09-08
Payer: MEDICAID

## 2023-09-08 DIAGNOSIS — R10.84 GENERALIZED ABDOMINAL PAIN: Primary | ICD-10-CM

## 2023-09-08 PROCEDURE — 99213 OFFICE O/P EST LOW 20 MIN: CPT | Performed by: FAMILY MEDICINE

## 2023-09-25 NOTE — PROGRESS NOTES
Subjective   Tyrone Sanchez is a 43 y.o. male.     Chief Complaint   Patient presents with    Heartburn    Follow-up       History of Present Illness     Tyrone Sanchez presents today for   Chief Complaint   Patient presents with    Heartburn    Follow-up       Doing better. Lab review    You have chosen to receive care through a telehealth visit.  Do you consent to use a video/audio connection for your medical care today? Yes    Patient location: [address] [place], [place] [id number]   Provider Location: [address] [place] [address]    The following portions of the patient's history were reviewed and updated as appropriate: allergies, current medications, past family history, past medical history, past social history, past surgical history and problem list.    Active Ambulatory Problems     Diagnosis Date Noted    No Active Ambulatory Problems     Resolved Ambulatory Problems     Diagnosis Date Noted    No Resolved Ambulatory Problems     Past Medical History:   Diagnosis Date    Herpes simplex antibody positive      No past surgical history on file.  Family History   Problem Relation Age of Onset    Other Father     Diabetes Maternal Grandfather     Cancer Paternal Grandfather      Social History     Socioeconomic History    Marital status: Single   Tobacco Use    Smoking status: Former     Packs/day: 1.00     Years: 20.00     Pack years: 20.00     Types: Cigarettes     Start date:      Quit date: 2017     Years since quittin.2    Smokeless tobacco: Never   Vaping Use    Vaping Use: Some days    Substances: Nicotine    Devices: Refillable tank    Passive vaping exposure: Yes   Substance and Sexual Activity    Alcohol use: Yes     Alcohol/week: 2.0 standard drinks     Types: 2 Cans of beer per week     Comment: week    Drug use: Yes     Types: Marijuana    Sexual activity: Yes       Review of Systems  Review of Systems -  General ROS: negative for - chills, fever or night sweats  Cardiovascular  ROS: no chest pain or dyspnea on exertion  Gastrointestinal ROS: no abdominal pain, change in bowel habits, or black or bloody stools  Genito-Urinary ROS: no dysuria, trouble voiding, or hematuria    Objective   There were no vitals taken for this visit.  Vitals obtained from patient if available  Physical Exam  Const: Non-toxic appearing, NAD, A&Ox4, Pleasant, Cooperative  Eyes: EOMI, no conjunctivitis  ENT: No copious nasal drainage noted  Cardiac: Regular rate by pulse  Resp: Respiratory rate observed to be within normal limits, no increased work of breathing observed, no audible wheezing or cough noted  Psych: Appropriate mood and behavior.  Procedures  Assessment & Plan   Problem List Items Addressed This Visit    None  Visit Diagnoses       Generalized abdominal pain    -  Primary          Continue omeprazole    See patient diagnoses and orders along with patient instructions for assessment, plan, and changes to care for patient.    This visit was conducted via telemedicine with live video and audio provided through Video Options: MyChart/Zoom at the point of care.    There are no Patient Instructions on file for this visit.    No follow-ups on file.    Ambulatory progress note signed and attested to by Amari Edwards D.O.

## 2023-10-09 DIAGNOSIS — R10.84 GENERALIZED ABDOMINAL PAIN: ICD-10-CM

## 2023-10-09 RX ORDER — OMEPRAZOLE 40 MG/1
40 CAPSULE, DELAYED RELEASE ORAL 2 TIMES DAILY
Qty: 30 CAPSULE | Refills: 0 | Status: SHIPPED | OUTPATIENT
Start: 2023-10-09

## 2024-01-25 ENCOUNTER — OFFICE VISIT (OUTPATIENT)
Dept: FAMILY MEDICINE CLINIC | Facility: CLINIC | Age: 44
End: 2024-01-25
Payer: MEDICAID

## 2024-01-25 VITALS
HEIGHT: 68 IN | WEIGHT: 142.8 LBS | BODY MASS INDEX: 21.64 KG/M2 | SYSTOLIC BLOOD PRESSURE: 102 MMHG | DIASTOLIC BLOOD PRESSURE: 68 MMHG

## 2024-01-25 DIAGNOSIS — K03.6 DENTAL PLAQUE: ICD-10-CM

## 2024-01-25 DIAGNOSIS — F12.93 CANNABIS WITHDRAWAL: Primary | ICD-10-CM

## 2024-01-25 DIAGNOSIS — H93.12 TINNITUS OF LEFT EAR: ICD-10-CM

## 2024-01-25 DIAGNOSIS — R09.81 NASAL CONGESTION: ICD-10-CM

## 2024-01-25 PROCEDURE — 99214 OFFICE O/P EST MOD 30 MIN: CPT | Performed by: FAMILY MEDICINE

## 2024-01-25 RX ORDER — MIRTAZAPINE 15 MG/1
15 TABLET, FILM COATED ORAL NIGHTLY
Qty: 30 TABLET | Refills: 2 | Status: SHIPPED | OUTPATIENT
Start: 2024-01-25

## 2024-01-25 RX ORDER — FLUTICASONE PROPIONATE 50 MCG
1 SPRAY, SUSPENSION (ML) NASAL DAILY
Qty: 16 G | Refills: 11 | Status: SHIPPED | OUTPATIENT
Start: 2024-01-25

## 2024-01-25 RX ORDER — BUSPIRONE HYDROCHLORIDE 10 MG/1
10 TABLET ORAL 2 TIMES DAILY PRN
Qty: 60 TABLET | Refills: 0 | Status: SHIPPED | OUTPATIENT
Start: 2024-01-25

## 2024-02-12 NOTE — PROGRESS NOTES
Established Patient Office Visit      Patient Name: Tyrone Sanchez  : 1980   MRN: [id number]   Care Team: Patient Care Team:  Amari Edwards DO as PCP - General (Family Medicine)    Chief Complaint:    Chief Complaint   Patient presents with    Tinnitus     Pt co tinnitus and weed withdrawal    Insomnia       History of Present Illness: Tyrone Sanchez is a 44 y.o. male who is here today for chief complaint.    HPI    Has completely come off marijuana in the last few weeks after cyclic vomiting syndrome.    This patient is accompanied by their self who contributes to the history of their care.    The following portions of the patient's history were reviewed and updated as appropriate: allergies, current medications, past family history, past medical history, past social history, past surgical history and problem list.    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    Family History:   Family History   Problem Relation Age of Onset    Other Father     Diabetes Maternal Grandfather     Cancer Paternal Grandfather        Social History:   Social History     Socioeconomic History    Marital status: Single   Tobacco Use    Smoking status: Former     Packs/day: 1.00     Years: 20.00     Additional pack years: 0.00     Total pack years: 20.00     Types: Cigarettes     Start date:      Quit date: 2017     Years since quittin.6    Smokeless tobacco: Never   Vaping Use    Vaping Use: Some days    Substances: Nicotine    Devices: Refillable tank    Passive vaping exposure: Yes   Substance and Sexual Activity    Alcohol use: Yes     Alcohol/week: 2.0 standard drinks of alcohol     Types: 2 Cans of beer per week     Comment: week    Drug use: Yes     Types: Marijuana    Sexual activity: Yes  "      Tobacco History:   Social History     Tobacco Use   Smoking Status Former    Packs/day: 1.00    Years: 20.00    Additional pack years: 0.00    Total pack years: 20.00    Types: Cigarettes    Start date:     Quit date: 2017    Years since quittin.6   Smokeless Tobacco Never       Medications:     Current Outpatient Medications:     multivitamin with minerals tablet tablet, Take 1 tablet by mouth Daily., Disp: , Rfl:     busPIRone (BUSPAR) 10 MG tablet, Take 1 tablet by mouth 2 (Two) Times a Day As Needed (agitation)., Disp: 60 tablet, Rfl: 0    fluticasone (FLONASE) 50 MCG/ACT nasal spray, 1 spray into the nostril(s) as directed by provider Daily., Disp: 16 g, Rfl: 11    mirtazapine (Remeron) 15 MG tablet, Take 1 tablet by mouth Every Night., Disp: 30 tablet, Rfl: 2    Allergies:   Allergies   Allergen Reactions    Penicillins Anaphylaxis       Objective   Objective     Physical Exam:  Vital Signs:   Vitals:    24 0904   BP: 102/68   BP Location: Left arm   Patient Position: Sitting   Cuff Size: Adult   Weight: 64.8 kg (142 lb 12.8 oz)   Height: 173.4 cm (68.27\")     Body mass index is 21.54 kg/m².     Physical Exam  Nursing note reviewed  Const: NAD, A&Ox4, Pleasant, Cooperative  Eyes: EOMI, no conjunctivitis  ENT: No nasal discharge present, neck supple  Cardiac: Regular rate and rhythm, no cyanosis  Resp: Respiratory rate within normal limits, no increased work of breathing, no audible wheezing or retractions noted  GI: No distention or ascites  MSK: Motor and sensation grossly intact in bilateral upper extremities  Neurologic: CN II-XII grossly intact  Psych: Appropriate mood and behavior.  Skin: Warm, dry  Procedures/Radiology     Procedures  No radiology results for the last 7 days     Assessment & Plan   Assessment / Plan      Assessment/Plan:   Problems Addressed This Visit  Diagnoses and all orders for this visit:    1. Cannabis withdrawal (Primary)  -     mirtazapine (Remeron) 15 MG " tablet; Take 1 tablet by mouth Every Night.  Dispense: 30 tablet; Refill: 2  -     busPIRone (BUSPAR) 10 MG tablet; Take 1 tablet by mouth 2 (Two) Times a Day As Needed (agitation).  Dispense: 60 tablet; Refill: 0    2. Nasal congestion  -     fluticasone (FLONASE) 50 MCG/ACT nasal spray; 1 spray into the nostril(s) as directed by provider Daily.  Dispense: 16 g; Refill: 11    3. Tinnitus of left ear  -     fluticasone (FLONASE) 50 MCG/ACT nasal spray; 1 spray into the nostril(s) as directed by provider Daily.  Dispense: 16 g; Refill: 11    4. Dental plaque  -     Ambulatory Referral to Dentistry      Problem List Items Addressed This Visit    None  Visit Diagnoses       Cannabis withdrawal    -  Primary    Relevant Medications    mirtazapine (Remeron) 15 MG tablet    busPIRone (BUSPAR) 10 MG tablet    Nasal congestion        Relevant Medications    fluticasone (FLONASE) 50 MCG/ACT nasal spray    Tinnitus of left ear        Relevant Medications    fluticasone (FLONASE) 50 MCG/ACT nasal spray    Dental plaque        Relevant Orders    Ambulatory Referral to Dentistry (Completed)            There are no Patient Instructions on file for this visit.    Follow Up:   Return in about 6 weeks (around 3/7/2024) for video visit.        DO ROSANNE Watts RD  [facility] PRIMARY [facility]ARE  [address]  [facility] [address]  Fax [phone]  Phone [phone]

## 2024-05-10 DIAGNOSIS — Z20.2 EXPOSURE TO STD: Primary | ICD-10-CM

## 2024-05-10 RX ORDER — AZITHROMYCIN 500 MG/1
TABLET, FILM COATED ORAL
Qty: 4 TABLET | Refills: 0 | Status: SHIPPED | OUTPATIENT
Start: 2024-05-10 | End: 2024-05-13

## 2024-05-10 RX ORDER — METRONIDAZOLE 500 MG/1
2000 TABLET ORAL ONCE
Qty: 4 TABLET | Refills: 0 | Status: SHIPPED | OUTPATIENT
Start: 2024-05-10 | End: 2024-05-10

## 2024-05-15 DIAGNOSIS — F12.93 CANNABIS WITHDRAWAL: ICD-10-CM

## 2024-05-15 RX ORDER — MIRTAZAPINE 15 MG/1
15 TABLET, FILM COATED ORAL NIGHTLY
Qty: 90 TABLET | Refills: 0 | Status: SHIPPED | OUTPATIENT
Start: 2024-05-15

## 2024-07-19 ENCOUNTER — OFFICE VISIT (OUTPATIENT)
Dept: FAMILY MEDICINE CLINIC | Facility: CLINIC | Age: 44
End: 2024-07-19
Payer: COMMERCIAL

## 2024-07-19 DIAGNOSIS — M65.30 TRIGGER FINGER OF LEFT HAND, UNSPECIFIED FINGER: Primary | ICD-10-CM

## 2024-07-19 PROCEDURE — 99213 OFFICE O/P EST LOW 20 MIN: CPT | Performed by: FAMILY MEDICINE

## 2024-08-07 VITALS
HEIGHT: 69 IN | SYSTOLIC BLOOD PRESSURE: 116 MMHG | RESPIRATION RATE: 16 BRPM | OXYGEN SATURATION: 97 % | HEART RATE: 78 BPM | BODY MASS INDEX: 21.09 KG/M2 | DIASTOLIC BLOOD PRESSURE: 66 MMHG

## 2024-10-24 ENCOUNTER — OFFICE VISIT (OUTPATIENT)
Dept: FAMILY MEDICINE CLINIC | Facility: CLINIC | Age: 44
End: 2024-10-24
Payer: COMMERCIAL

## 2024-10-24 ENCOUNTER — TELEPHONE (OUTPATIENT)
Dept: FAMILY MEDICINE CLINIC | Facility: CLINIC | Age: 44
End: 2024-10-24

## 2024-10-24 VITALS
SYSTOLIC BLOOD PRESSURE: 120 MMHG | OXYGEN SATURATION: 98 % | BODY MASS INDEX: 21.08 KG/M2 | HEIGHT: 69 IN | DIASTOLIC BLOOD PRESSURE: 74 MMHG | HEART RATE: 78 BPM

## 2024-10-24 DIAGNOSIS — K42.9 UMBILICAL HERNIA WITHOUT OBSTRUCTION AND WITHOUT GANGRENE: Primary | ICD-10-CM

## 2024-10-24 DIAGNOSIS — M65.30 TRIGGER FINGER OF LEFT HAND, UNSPECIFIED FINGER: ICD-10-CM

## 2024-10-24 PROCEDURE — 99214 OFFICE O/P EST MOD 30 MIN: CPT | Performed by: FAMILY MEDICINE

## 2024-10-24 NOTE — TELEPHONE ENCOUNTER
Patient Aids called in regards to the Abdominal Binder; not covered thru ins; out of pocket only  Please advise    PH:  [phone]

## 2024-10-24 NOTE — PATIENT INSTRUCTIONS
Wear an abdominal binder to reduce pressure and lifting  Avoid lifting >10 lbs  Avoid any lifting/exertion that requires holding your breath  Referrals for hand surgery and abdominal surgeon

## 2024-10-24 NOTE — TELEPHONE ENCOUNTER
Unable to reach Tyrone Sanchez by phone or leave message.    Hub may relay message and document.    Attempted to call patient to inform him that the binder is not covered with insurance but out of pocket. He can call patient aids to discuss the cost.

## 2024-10-24 NOTE — PROGRESS NOTES
"Established Patient Office Visit      Patient Name: Tyrone Sanchez  : 1980   MRN: [id number]   Care Team: Patient Care Team:  Amari Edwards DO as PCP - General (Family Medicine)    Chief Complaint:    Chief Complaint   Patient presents with   • Hernia     Pt. States he could possibly have a Hernia around the Belly Button Area. Pt. States for the last 2 weeks he has noticed Off and On Pain in the Mid Abdomin.    • Trigger Finger      Pt. States his Left Trigger Finger is worse since last visit       History of Present Illness: Tyrone Sanchez is a 44 y.o. male who is here today for chief complaint.    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  He thinks that is when he first noticed the hernia around his bellybutton, \"I used to have an innie but now it is an outie.\"  He works at [employer] in [place], so has been busy and lifting a lot over the last few weeks, has noticed worsening pain with lifting around his bellybutton, as well as worse pain at the end of the day.  [employer] in [place] wraps on Saturday so if he is going to have it fixed now would be a good time.    He has been wrapping his left fifth finger to minimize bending and triggering, but reports that the pain is still little worse than at his last visit.  Part of this is that he has been busy and active at work, but he has still been doing proper guidance for his finger.    This patient is accompanied by their self who contributes to the history of their care.    The following portions of the patient's history were reviewed and updated as appropriate: allergies, current medications, past family history, past medical history, past social history, past surgical history and problem list.    Subjective      Review of Systems:   Review of Systems - See HPI    Past Medical History:   Past Medical History:   Diagnosis Date   • Herpes simplex antibody positive     HSV1- No " history of outbreaks       Past Surgical History: History reviewed. No pertinent surgical history.    Family History:   Family History   Problem Relation Age of Onset   • Other Father    • Diabetes Maternal Grandfather    • Cancer Paternal Grandfather        Social History:   Social History     Socioeconomic History   • Marital status: Single   Tobacco Use   • Smoking status: Former     Current packs/day: 0.00     Average packs/day: 1 pack/day for 20.0 years (20.0 ttl pk-yrs)     Types: Cigarettes     Start date:      Quit date: 2017     Years since quittin.3     Passive exposure: Never   • Smokeless tobacco: Never   Vaping Use   • Vaping status: Some Days   • Substances: Nicotine   • Devices: Refillable tank   • Passive vaping exposure: Yes   Substance and Sexual Activity   • Alcohol use: Yes     Alcohol/week: 2.0 standard drinks of alcohol     Types: 2 Cans of beer per week     Comment: week   • Drug use: Yes     Types: Marijuana   • Sexual activity: Yes       Tobacco History:   Social History     Tobacco Use   Smoking Status Former   • Current packs/day: 0.00   • Average packs/day: 1 pack/day for 20.0 years (20.0 ttl pk-yrs)   • Types: Cigarettes   • Start date:    • Quit date: 2017   • Years since quittin.3   • Passive exposure: Never   Smokeless Tobacco Never       Medications:   Outpatient Medications Prior to Visit   Medication Sig Dispense Refill   • busPIRone (BUSPAR) 10 MG tablet Take 1 tablet by mouth 2 (Two) Times a Day As Needed (agitation). 60 tablet 0   • fluticasone (FLONASE) 50 MCG/ACT nasal spray 1 spray into the nostril(s) as directed by provider Daily. 16 g 11   • mirtazapine (REMERON) 15 MG tablet TAKE ONE TABLET BY MOUTH ONCE NIGHTLY 90 tablet 0   • multivitamin with minerals tablet tablet Take 1 tablet by mouth Daily.       No facility-administered medications prior to visit.        Allergies:   Allergies   Allergen Reactions   • Penicillins Anaphylaxis       Objective  "  Objective     Physical Exam:  Vital Signs:   Vitals:    10/24/24 0838   BP: 120/74   Pulse: 78   SpO2: 98%   Height: 175.3 cm (69.02\")   PainSc:   4   PainLoc: Abdomen     Body mass index is 21.08 kg/m².     Physical Exam  Nursing note reviewed  Const: NAD, A&Ox4, Pleasant, Cooperative  Eyes: EOMI, no conjunctivitis  ENT: No nasal discharge present, neck supple  Cardiac: Regular rate and rhythm, no cyanosis  Resp: Respiratory rate within normal limits, no increased work of breathing, no audible wheezing or retractions noted  GI: No distention or ascites.  1 cm umbilical hernia at the 12 o'clock position.  Nontender, minimally reducible  Procedures/Radiology     Procedures  No radiology results for the last 7 days     Assessment & Plan   Assessment / Plan      Assessment/Plan:   Problems Addressed This Visit  Diagnoses and all orders for this visit:    1. Umbilical hernia without obstruction and without gangrene (Primary)  -     CT Abdomen Pelvis Without Contrast; Future  -     Ambulatory Referral to General Surgery  -     Abdominal Binder    2. Trigger finger of left hand, unspecified finger  -     Ambulatory Referral to Orthopedic Surgery      Problem List Items Addressed This Visit    None  Visit Diagnoses       Umbilical hernia without obstruction and without gangrene    -  Primary    Relevant Orders    CT Abdomen Pelvis Without Contrast    Ambulatory Referral to General Surgery    Abdominal Binder    Trigger finger of left hand, unspecified finger        Relevant Orders    Ambulatory Referral to Orthopedic Surgery          Orders Placed This Encounter   Procedures   • Abdominal Binder     Please fax to patient aids     Order Specific Question:   Length of Need     Answer:   Other     Order Specific Question:   Length of Need     Answer:   2 weeks   • CT Abdomen Pelvis Without Contrast     Standing Status:   Future     Standing Expiration Date:   10/24/2025     Order Specific Question:   Will Oral Contrast be " needed for this procedure?     Answer:   No     Order Specific Question:   Release to patient     Answer:   Routine Release [[id number]]     Order Specific Question:   Reason for Exam:     Answer:   umbilical hernia   • Ambulatory Referral to Orthopedic Surgery     Referral Priority:   Routine     Referral Type:   Consultation     Referral Reason:   Specialty Services Required     Requested Specialty:   Orthopedic Surgery     Number of Visits Requested:   1   • Ambulatory Referral to General Surgery     Referral Priority:   Routine     Referral Type:   Consultation     Referral Reason:   Specialty Services Required     Requested Specialty:   General Surgery     Number of Visits Requested:   1     Referral to orthopedic surgery for further evaluation and management for his trigger finger    Referral to general surgery for evaluation and management of his umbilical hernia.  Given guidance on lifting, abdominal binder sent to DME to reduce intra-abdominal pressure on exertion at work.    Patient Instructions   Wear an abdominal binder to reduce pressure and lifting  Avoid lifting >10 lbs  Avoid any lifting/exertion that requires holding your breath  Referrals for hand surgery and abdominal surgeon    Follow Up:   Return if symptoms worsen or fail to improve.        DO ROSANNE Watts RD  [facility]IMARY [facility]  [address] [facility] [address]  Fax [phone]  Phone [phone]    Disclaimer to patients: The 21st Century Cares Act makes medical notes like these available to patients in the interest of transparency. However, please be advised that this is still a medical document. It is intended as [id number] communication. Many sections may include medical language or jargon, abbreviations, and additional verbiage that are unfamiliar or confusing. In some ways it may come across as blunt, direct, or may be summarized in order to clearly and  concisely communicate the most crucial information to medical professionals. It may also include mentions of conditions that are unlikely but considered as part of the differential diagnosis, including serious disorders. These are not always discussed at length at the time of appointment because their likelihood is so low, but may be included in a medical note to make it clear what has been considered and/or ruled out as part of a work-up. Medical documents are intended to carry relevant information, facts as evident, and the personal clinical opinion of the physician. If you have any questions regarding this medical document, please bring them to the attention of the physician at your next scheduled appointment.

## 2024-10-31 ENCOUNTER — OFFICE VISIT (OUTPATIENT)
Age: 44
End: 2024-10-31
Payer: COMMERCIAL

## 2024-10-31 VITALS
DIASTOLIC BLOOD PRESSURE: 72 MMHG | HEIGHT: 68 IN | WEIGHT: 151.6 LBS | SYSTOLIC BLOOD PRESSURE: 104 MMHG | BODY MASS INDEX: 22.97 KG/M2

## 2024-10-31 DIAGNOSIS — M65.352 TRIGGER LITTLE FINGER OF LEFT HAND: Primary | ICD-10-CM

## 2024-10-31 RX ORDER — LIDOCAINE HYDROCHLORIDE 10 MG/ML
0.5 INJECTION, SOLUTION EPIDURAL; INFILTRATION; INTRACAUDAL; PERINEURAL
Status: COMPLETED | OUTPATIENT
Start: 2024-10-31 | End: 2024-10-31

## 2024-10-31 RX ORDER — TRIAMCINOLONE ACETONIDE 40 MG/ML
20 INJECTION, SUSPENSION INTRA-ARTICULAR; INTRAMUSCULAR
Status: COMPLETED | OUTPATIENT
Start: 2024-10-31 | End: 2024-10-31

## 2024-10-31 RX ADMIN — LIDOCAINE HYDROCHLORIDE 0.5 ML: 10 INJECTION, SOLUTION EPIDURAL; INFILTRATION; INTRACAUDAL; PERINEURAL at 08:59

## 2024-10-31 RX ADMIN — TRIAMCINOLONE ACETONIDE 20 MG: 40 INJECTION, SUSPENSION INTRA-ARTICULAR; INTRAMUSCULAR at 08:59

## 2024-10-31 NOTE — PROGRESS NOTES
[facility] Orthopedic     Office Visit       Date: 10/31/2024   Patient Name: Tyrone Sanchez  MRN: [id number]  YOB: 1980    Referring Physician: Amari Edwards DO     Chief Complaint:   Chief Complaint   Patient presents with   • Left Hand - Pain     -5th trigger finger       History of Present Illness:   Tyrone Sanchez is a 44 y.o. male right-hand-dominant presents with left small finger pain catching locking of 6 months duration.  Patient reports initially noticed catching and locking of the left small finger PIP flexion.  Reports that later became painful.  He has been immobilizing with a Coban wrap for the last several weeks.  He also reports pain at the PIP.  He is otherwise healthy.  He works as a  that [employer].  He denies smoking.      Subjective   Review of Systems:   Review of Systems   Constitutional: Negative.  Negative for chills, fatigue and fever.   HENT: Negative.  Negative for congestion and dental problem.    Eyes: Negative.  Negative for blurred vision.   Respiratory: Negative.  Negative for shortness of breath.    Cardiovascular: Negative.  Negative for leg swelling.   Gastrointestinal: Negative.  Negative for abdominal pain.   Endocrine: Negative.  Negative for polyuria.   Genitourinary: Negative.  Negative for difficulty urinating.   Musculoskeletal:  Positive for arthralgias.   Skin: Negative.    Allergic/Immunologic: Negative.    Neurological: Negative.    Hematological: Negative.  Negative for adenopathy.   Psychiatric/Behavioral: Negative.  Negative for behavioral problems.         Pertinent review of systems per HPI.     I reviewed the patient's chief complaint, history of present illness, review of systems, past medical history, surgical history, family history, social history, medications and allergy list in the EMR on 10/31/2024 and agree with the findings above.    Objective    Vital Signs:  "  Vitals:    10/31/24 0846   BP: 104/72   Weight: 68.8 kg (151 lb 9.6 oz)   Height: 172.5 cm (67.9\")     BMI: Body mass index is 23.12 kg/m².    General Appearance: No acute distress. Alert and oriented.     Chest:  Non-labored breathing on room air. Regular rate and rhythm.    Upper Extremity Exam:    I was tender to palpation over the A1 pulley left small finger with palpable nodule.  Mildly tender palpation of the dorsal PIP capsule.  Limited flexion at the PIPJ due to capsular tightness.    Fingers are warm, well-perfused with appropriate capillary refill.  Palpable radial pulse.    Sensation intact to light touch in median, radial and ulnar nerve distributions.    Motor- Fires FPL, ulnar intrinsics, EPL/EDC w/ full active and passive range of motion. Strength intact.    Non-tender except for in the areas highlighted    Imaging/Studies:   Imaging Results (Last 24 Hours)       ** No results found for the last 24 hours. **              Procedures:  Procedures    Quality Measures:   ACP:   ACP discussion was deferred.    Tobacco:   Tyrone Sanchez  reports that he quit smoking about 7 years ago. His smoking use included cigarettes. He started smoking about 22 years ago. He has a 20 pack-year smoking history. He has never been exposed to tobacco smoke. He has never used smokeless tobacco.      Assessment / Plan    Assessment/Plan:     There are no diagnoses linked to this encounter.     Tyrone Moreno a 44 y.o. male who presents with:      ICD-10-CM ICD-9-CM   1. Trigger little finger of left hand  M65.352 727.03         Presents left small finger trigger finger.  We discussed diagnosis of trigger fingers options for treatment including anti-inflammatories, bracing and corticosteroid injection.  We also discussed surgery.  Recommend left small finger trigger finger injection today.  With he also has stiffness of the PIP due to prolonged immobilization.  Recommend referral to hand therapy to be on active and passive " range of motion of the left small finger.  Recommend patient follow-up in 2 months for repeat check.    Procedure Note:    I discussed with the patient the potential benefits of performing therapeutic aspiration and injections as well as potential risks including but not limited to infection, swelling, pain, bleeding, bruising, nerve/vessel damage, skin color changes, transient elevation in blood glucose levels, and fat atrophy. After informed consent and after the areas were prepped with chlorhexadine soap, ethyl chloride was used to numb the skin. 0.5 mL of 1% lidocaine was injected followed by injection of 20mg of Kenalog into the A1 pulley of the left small finger.  The patient tolerated the procedure well. There were no complications. A sterile dressing was placed over the injection sites.      Follow Up:   Return if symptoms worsen or fail to improve.        Selvin Pickens MD  [facility] Surgeon

## 2024-10-31 NOTE — PROGRESS NOTES
Procedure   - Hand/Upper Extremity Injection: L small A1 for trigger finger on 10/31/2024 8:59 AM  Indications: pain  Details: 27 G needle, volar approach  Medications: 0.5 mL lidocaine PF 1% 1 %; 20 mg triamcinolone acetonide 40 MG/ML  Outcome: tolerated well, no immediate complications  Procedure, treatment alternatives, risks and benefits explained, specific risks discussed. Consent was given by the patient. Immediately prior to procedure a time out was called to verify the correct patient, procedure, equipment, support staff and site/side marked as required. Patient was prepped and draped in the usual sterile fashion.

## 2024-12-10 ENCOUNTER — OFFICE VISIT (OUTPATIENT)
Dept: FAMILY MEDICINE CLINIC | Facility: CLINIC | Age: 44
End: 2024-12-10
Payer: COMMERCIAL

## 2024-12-10 VITALS
HEIGHT: 68 IN | WEIGHT: 158.8 LBS | SYSTOLIC BLOOD PRESSURE: 129 MMHG | DIASTOLIC BLOOD PRESSURE: 82 MMHG | OXYGEN SATURATION: 97 % | BODY MASS INDEX: 24.07 KG/M2 | HEART RATE: 87 BPM

## 2024-12-10 DIAGNOSIS — M65.30 TRIGGER FINGER OF LEFT HAND, UNSPECIFIED FINGER: ICD-10-CM

## 2024-12-10 DIAGNOSIS — K42.9 UMBILICAL HERNIA WITHOUT OBSTRUCTION AND WITHOUT GANGRENE: Primary | ICD-10-CM

## 2024-12-10 PROCEDURE — 99213 OFFICE O/P EST LOW 20 MIN: CPT | Performed by: FAMILY MEDICINE

## 2024-12-10 NOTE — PROGRESS NOTES
Established Patient Office Visit      Patient Name: Tyrone Sanchez  : 1980   MRN: [id number]   Care Team: Patient Care Team:  Amari Edwards DO as PCP - General (Family Medicine)  Selvin Pickens MD as Consulting Physician (Orthopedic Surgery)    Chief Complaint:    Chief Complaint   Patient presents with    Hernia     Follow up        History of Present Illness: Tyrone Sanchez is a 44 y.o. male who is here today for chief complaint.    Hernia      Left hand improved with hand therapy exercises and taping    Having snoring and possible witnessed apneas at night per S.O. His mother suffers from sleep apnea and wears CPAP.    Took 6-8 months for brain to readjust off CBD, doing well now though.    Still having issues with hernia, was seen for same issue in October. US was going to be $300 and insurance told him they would not pay for elective repair anyway. Having some worsening discomfort and constipation intermittently. His job has been understanding but the lifting precautions are starting to be a long-term concern for his job.    This patient is accompanied by their significant other Monique who contributes to the history of their care.    The following portions of the patient's history were reviewed and updated as appropriate: allergies, current medications, past family history, past medical history, past social history, past surgical history and problem list.    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 No pertinent surgical history.    Family History:   Family History   Problem Relation Age of Onset    Diabetes Maternal Grandfather     Cancer Paternal Grandfather         Pancreatic       Social History:   Social History     Socioeconomic History    Marital status: Single   Tobacco Use     "Smoking status: Former     Current packs/day: 0.00     Average packs/day: 1 pack/day for 20.0 years (20.0 ttl pk-yrs)     Types: Cigarettes     Start date: 2002     Quit date: 2017     Years since quittin.4     Passive exposure: Never    Smokeless tobacco: Never   Vaping Use    Vaping status: Some Days    Substances: Nicotine    Devices: Refillable tank    Passive vaping exposure: Yes   Substance and Sexual Activity    Alcohol use: Yes     Alcohol/week: 2.0 standard drinks of alcohol     Types: 2 Cans of beer per week     Comment: week    Drug use: Yes     Types: Marijuana    Sexual activity: Yes       Tobacco History:   Social History     Tobacco Use   Smoking Status Former    Current packs/day: 0.00    Average packs/day: 1 pack/day for 20.0 years (20.0 ttl pk-yrs)    Types: Cigarettes    Start date: 2002    Quit date: 2017    Years since quittin.4    Passive exposure: Never   Smokeless Tobacco Never       Medications:   Outpatient Medications Prior to Visit   Medication Sig Dispense Refill    busPIRone (BUSPAR) 10 MG tablet Take 1 tablet by mouth 2 (Two) Times a Day As Needed (agitation). (Patient not taking: Reported on 12/10/2024) 60 tablet 0    fluticasone (FLONASE) 50 MCG/ACT nasal spray 1 spray into the nostril(s) as directed by provider Daily. (Patient not taking: Reported on 12/10/2024) 16 g 11    mirtazapine (REMERON) 15 MG tablet TAKE ONE TABLET BY MOUTH ONCE NIGHTLY (Patient not taking: Reported on 12/10/2024) 90 tablet 0    multivitamin with minerals tablet tablet Take 1 tablet by mouth Daily. (Patient not taking: Reported on 12/10/2024)       No facility-administered medications prior to visit.        Allergies:   Allergies   Allergen Reactions    Penicillins Anaphylaxis       Objective   Objective     Physical Exam:  Vital Signs:   Vitals:    12/10/24 0835   BP: 129/82   Pulse: 87   SpO2: 97%   Weight: 72 kg (158 lb 12.8 oz)   Height: 172.5 cm (67.91\")     Body mass index is " 24.21 kg/m².     Physical Exam  Abdominal:      General: A surgical scar is present. There is no distension.      Palpations: Abdomen is soft. There is no fluid wave or hepatomegaly. Pulsatile midline mass: palpable reducible defect.     Tenderness: There is abdominal tenderness.          Comments: Pal       Nursing note reviewed  Procedures/Radiology     Procedures  No radiology results for the last 7 days     Assessment & Plan   Assessment / Plan      Assessment/Plan:   Problems Addressed This Visit  Diagnoses and all orders for this visit:    1. Umbilical hernia without obstruction and without gangrene (Primary)    2. Trigger finger of left hand, unspecified finger      Problem List Items Addressed This Visit    None  Visit Diagnoses       Umbilical hernia without obstruction and without gangrene    -  Primary    Trigger finger of left hand, unspecified finger              No orders of the defined types were placed in this encounter.    For sleep epnea symptoms wants to hold off on testnig due to cost for now, but recommend nasal strips, mouth guard, left lateral sleeping position. RTC if needed.    For hernia, clear on exam. Recommended weight lifting belt, lifting precautions to continue for work. Referral to general surgery.    Trigger finger is doing well with home PT and taping.    Patient Instructions   Wear weight-lifting belt at work  Referral to surgeon for supraumbilical hernia    Follow Up:   Return in about 6 months (around 6/10/2025) for Annual.        DO ROSANNE Watts RD  [facility]  [address] [facility] [address]  Fax [phone]  Phone [phone]    Disclaimer to patients: The 21st Century Cares Act makes medical notes like these available to patients in the interest of transparency. However, please be advised that this is still a medical document. It is intended as [id number] communication. Many sections may  include medical language or jargon, abbreviations, and additional verbiage that are unfamiliar or confusing. In some ways it may come across as blunt, direct, or may be summarized in order to clearly and concisely communicate the most crucial information to medical professionals. It may also include mentions of conditions that are unlikely but considered as part of the differential diagnosis, including serious disorders. These are not always discussed at length at the time of appointment because their likelihood is so low, but may be included in a medical note to make it clear what has been considered and/or ruled out as part of a work-up. Medical documents are intended to carry relevant information, facts as evident, and the personal clinical opinion of the physician. If you have any questions regarding this medical document, please bring them to the attention of the physician at your next scheduled appointment.

## 2025-07-09 ENCOUNTER — TELEPHONE (OUTPATIENT)
Dept: FAMILY MEDICINE CLINIC | Facility: CLINIC | Age: 45
End: 2025-07-09

## 2025-07-11 ENCOUNTER — OFFICE VISIT (OUTPATIENT)
Dept: FAMILY MEDICINE CLINIC | Facility: CLINIC | Age: 45
End: 2025-07-11
Payer: MEDICAID

## 2025-07-11 VITALS
HEIGHT: 68 IN | OXYGEN SATURATION: 99 % | WEIGHT: 173.8 LBS | BODY MASS INDEX: 26.34 KG/M2 | SYSTOLIC BLOOD PRESSURE: 112 MMHG | HEART RATE: 76 BPM | DIASTOLIC BLOOD PRESSURE: 70 MMHG

## 2025-07-11 DIAGNOSIS — D72.829 LEUKOCYTOSIS, UNSPECIFIED TYPE: ICD-10-CM

## 2025-07-11 DIAGNOSIS — R19.7 DIARRHEA OF PRESUMED INFECTIOUS ORIGIN: Primary | ICD-10-CM

## 2025-07-11 RX ORDER — AZITHROMYCIN 500 MG/1
TABLET, FILM COATED ORAL
Qty: 4 TABLET | Refills: 0 | Status: SHIPPED | OUTPATIENT
Start: 2025-07-11

## 2025-07-11 RX ORDER — SACCHAROMYCES BOULARDII 250 MG
250 CAPSULE ORAL 2 TIMES DAILY
Qty: 20 CAPSULE | Refills: 0 | Status: SHIPPED | OUTPATIENT
Start: 2025-07-11 | End: 2025-07-21

## 2025-07-11 NOTE — PATIENT INSTRUCTIONS
Probiotic  If not improving into weekend or if fever returns, start antibiotic  Avoid any alcohol, extra diary, etc

## 2025-07-11 NOTE — PROGRESS NOTES
Established Patient Office Visit      Patient Name: Tyrone Sanchez  : 1980   MRN: [id number]   Care Team: Patient Care Team:  Amari Edwards DO as PCP - General (Family Medicine)  Selvin Pickens MD as Consulting Physician (Orthopedic Surgery)    Chief Complaint:    Chief Complaint   Patient presents with    Diarrhea     Girlfriend got diagnosed with H plori.Had fever around        History of Present Illness: Tyrone Sanchez is a 45 y.o. male who is here today for Diarrhea (Girlfriend got diagnosed with H plori.Had fever around ).    Diarrhea         History of Present Illness  The patient is a 45-year-old male who presents today for an acute visit due to diarrhea.    He began experiencing diarrhea on 2025, accompanied by mild nausea and a slight fever of around 100 degrees. The following day, he felt better but continued to have diarrhea for 4 to 5 days. The symptoms subsided until today when the diarrhea returned. He reports no recurrence of fever or any abdominal pain. His stools were initially greenish for the first few days but have since returned to normal. He has not been taking any antibiotics.     His diet was normal on 2025, but he has been eating more vegetables and fiber since returning from a vacation in [place] where he weighed himself at a public scale and found his weight to be 180 pounds, which he has never been before. He reports no presence of oil, grease, blood, or coffee ground-like substances in his stool. His last episode of diarrhea was this morning. He typically uses the bathroom twice a day, but recently it has increased to 3 or 4 times. His stools are mostly liquid with some solids. He had normal bowel movements for 3 days before today's episode. He ate a slice of pizza yesterday and has been feeling slightly bloated.     He started taking Metamucil at the end of 2025, but not regularly. He resumed taking it after returning home and has  taken it 2 or 3 times. He experienced some diarrhea while taking Metamucil. He reports no swelling in his hands or feet.       The following portions of the patient's history were reviewed and updated as appropriate: allergies, current medications, past family history, past medical history, past social history, past surgical history and problem list.    Subjective      Review of Systems:   Review of Systems   Gastrointestinal:  Positive for diarrhea.    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 No pertinent surgical history.    Family History:   Family History   Problem Relation Age of Onset    Diabetes Maternal Grandfather     Cancer Paternal Grandfather         Pancreatic       Social History:   Social History     Socioeconomic History    Marital status: Single   Tobacco Use    Smoking status: Former     Current packs/day: 0.00     Average packs/day: 1 pack/day for 20.0 years (20.0 ttl pk-yrs)     Types: Cigarettes     Start date: 2002     Quit date: 2017     Years since quittin.0     Passive exposure: Never    Smokeless tobacco: Never   Vaping Use    Vaping status: Some Days    Substances: Nicotine    Devices: Refillable tank    Passive vaping exposure: Yes   Substance and Sexual Activity    Alcohol use: Yes     Alcohol/week: 2.0 standard drinks of alcohol     Types: 2 Cans of beer per week     Comment: week    Drug use: Yes     Types: Marijuana    Sexual activity: Yes       Tobacco History:   Social History     Tobacco Use   Smoking Status Former    Current packs/day: 0.00    Average packs/day: 1 pack/day for 20.0 years (20.0 ttl pk-yrs)    Types: Cigarettes    Start date: 2002    Quit date: 2017    Years since quittin.0    Passive exposure: Never   Smokeless Tobacco Never       Medications:   No outpatient medications prior to visit.  "    No facility-administered medications prior to visit.        Allergies:   Allergies   Allergen Reactions    Penicillins Anaphylaxis       Objective   Objective     Physical Exam:  Vital Signs:   Vitals:    07/11/25 0818   BP: 112/70   Pulse: 76   SpO2: 99%   Weight: 78.8 kg (173 lb 12.8 oz)   Height: 172.5 cm (67.91\")     Body mass index is 26.49 kg/m².     Physical Exam  Constitutional:       General: He is not in acute distress.     Appearance: Normal appearance. He is not ill-appearing, toxic-appearing or diaphoretic.   Abdominal:      General: Abdomen is protuberant. Bowel sounds are normal. There is no distension or abdominal bruit.      Palpations: Abdomen is soft. There is no shifting dullness, fluid wave, hepatomegaly, splenomegaly or mass.      Tenderness: There is no abdominal tenderness.   Neurological:      Mental Status: He is alert.       Nursing note reviewed  Procedures/Radiology     Procedures  No radiology results for the last 7 days     Assessment & Plan   Assessment / Plan      Assessment/Plan:   Problems Addressed This Visit  Diagnoses and all orders for this visit:    1. Diarrhea of presumed infectious origin (Primary)  -     Comprehensive Metabolic Panel; Future  -     CBC & Differential; Future  -     Lipase; Future  -     Fecal Fat, Qualitative - Stool, Per Rectum; Future  -     pH, Stool - Stool, Per Rectum; Future  -     Sedimentation Rate; Future  -     Fecal Lactoferrin Qual. - Stool, Per Rectum; Future  -     Calprotectin, Fecal - Stool, Per Rectum; Future  -     Hepatitis A Antibody, IgM; Future  -     Ova & Parasite Examination - Stool, Per Rectum; Future  -     saccharomyces boulardii (Florastor) 250 MG capsule; Take 1 capsule by mouth 2 (Two) Times a Day for 10 days.  Dispense: 20 capsule; Refill: 0  -     azithromycin (ZITHROMAX) 500 MG tablet; Take 2 tablets by mouth once for one dose. If symptoms persist, take 1 tab PO on day 2 and again on day 3.  Dispense: 4 tablet; Refill: " 0    2. Leukocytosis, unspecified type  Comments:  At  last year. Recheck today, may need to monitor at annual in a few months      Problem List Items Addressed This Visit    None  Visit Diagnoses         Diarrhea of presumed infectious origin    -  Primary    Relevant Medications    saccharomyces boulardii (Florastor) 250 MG capsule    azithromycin (ZITHROMAX) 500 MG tablet    Other Relevant Orders    Comprehensive Metabolic Panel    CBC & Differential    Lipase    Fecal Fat, Qualitative - Stool, Per Rectum    pH, Stool - Stool, Per Rectum    Sedimentation Rate    Fecal Lactoferrin Qual. - Stool, Per Rectum    Calprotectin, Fecal - Stool, Per Rectum    Hepatitis A Antibody, IgM    Ova & Parasite Examination - Stool, Per Rectum      Leukocytosis, unspecified type        At  last year. Recheck today, may need to monitor at annual in a few months            New Medications Ordered This Visit   Medications    saccharomyces boulardii (Florastor) 250 MG capsule     Sig: Take 1 capsule by mouth 2 (Two) Times a Day for 10 days.     Dispense:  20 capsule     Refill:  0    azithromycin (ZITHROMAX) 500 MG tablet     Sig: Take 2 tablets by mouth once for one dose. If symptoms persist, take 1 tab PO on day 2 and again on day 3.     Dispense:  4 tablet     Refill:  0        Assessment & Plan  1. Diarrhea.  - Symptoms include diarrhea, nausea, and a mild fever of 100°F on 07/04/2025, with diarrhea persisting for 4-5 days, resolving, and then recurring today.  - Physical exam reveals no abdominal pain, bloating, or tenderness; stool is mostly liquid with some solids, no blood or coffee ground appearance.  - Discussion includes the possibility of food poisoning or dietary changes, with no indication of H. pylori infection. Blood work will be rechecked, and a stool sample kit will be provided.  - Florastor probiotic prescribed for 24 hours. Patient advised to stay hydrated with Pedialyte and monitor symptoms. If severe abdominal  pain or blood in stool occurs, immediate notification is required.       Patient Instructions   Probiotic  If not improving into weekend or if fever returns, start antibiotic  Avoid any alcohol, extra diary, etc    Follow Up:   Return in about 3 months (around 10/11/2025) for Annual.    CHRISTIANO Edwards DO  [facility] [address]  [facility]  [address]  [facility] [address]  Fax [phone]  Phone [phone]    Patient or patient representative verbalized consent for the use of Ambient Listening during the visit with  Amari Edwards DO for chart documentation. 7/11/2025 09:37 EDT     Disclaimer to patients: The 21st Century Cares Act makes medical notes like these available to patients in the interest of transparency. However, please be advised that this is still a medical document. It is intended as layf-ei-iner communication. Many sections may include medical language or jargon, abbreviations, and additional verbiage that are unfamiliar or confusing. In some ways it may come across as blunt, direct, or may be summarized in order to clearly and concisely communicate the most crucial information to medical professionals. It may also include mentions of conditions that are unlikely but considered as part of the differential diagnosis, including serious disorders. These are not always discussed at length at the time of appointment because their likelihood is so low, but may be included in a medical note to make it clear what has been considered and/or ruled out as part of a work-up. Medical documents are intended to carry relevant information, facts as evident, and the personal clinical opinion of the physician. If you have any questions regarding this medical document, please bring them to the attention of the physician at your next scheduled appointment.

## 2025-07-29 ENCOUNTER — OFFICE VISIT (OUTPATIENT)
Dept: FAMILY MEDICINE CLINIC | Facility: CLINIC | Age: 45
End: 2025-07-29
Payer: MEDICAID

## 2025-07-29 ENCOUNTER — LAB (OUTPATIENT)
Dept: LAB | Facility: HOSPITAL | Age: 45
End: 2025-07-29
Payer: MEDICAID

## 2025-07-29 VITALS
DIASTOLIC BLOOD PRESSURE: 78 MMHG | BODY MASS INDEX: 25.31 KG/M2 | WEIGHT: 167 LBS | OXYGEN SATURATION: 99 % | SYSTOLIC BLOOD PRESSURE: 120 MMHG | HEIGHT: 68 IN | HEART RATE: 70 BPM

## 2025-07-29 DIAGNOSIS — R19.7 DIARRHEA OF PRESUMED INFECTIOUS ORIGIN: Primary | ICD-10-CM

## 2025-07-29 DIAGNOSIS — R19.7 DIARRHEA OF PRESUMED INFECTIOUS ORIGIN: ICD-10-CM

## 2025-07-29 LAB
ALBUMIN SERPL-MCNC: 4.3 G/DL (ref 3.5–5.2)
ALBUMIN/GLOB SERPL: 1.6 G/DL
ALP SERPL-CCNC: 52 U/L (ref 39–117)
ALT SERPL W P-5'-P-CCNC: 36 U/L (ref 1–41)
ANION GAP SERPL CALCULATED.3IONS-SCNC: 12 MMOL/L (ref 5–15)
AST SERPL-CCNC: 32 U/L (ref 1–40)
BASOPHILS # BLD AUTO: 0.02 10*3/MM3 (ref 0–0.2)
BASOPHILS NFR BLD AUTO: 0.3 % (ref 0–1.5)
BILIRUB SERPL-MCNC: 0.4 MG/DL (ref 0–1.2)
BUN SERPL-MCNC: 8 MG/DL (ref 6–20)
BUN/CREAT SERPL: 8.4 (ref 7–25)
CALCIUM SPEC-SCNC: 9.1 MG/DL (ref 8.6–10.5)
CHLORIDE SERPL-SCNC: 106 MMOL/L (ref 98–107)
CO2 SERPL-SCNC: 22 MMOL/L (ref 22–29)
CREAT SERPL-MCNC: 0.95 MG/DL (ref 0.76–1.27)
DEPRECATED RDW RBC AUTO: 38.5 FL (ref 37–54)
EGFRCR SERPLBLD CKD-EPI 2021: 101.9 ML/MIN/1.73
EOSINOPHIL # BLD AUTO: 0.08 10*3/MM3 (ref 0–0.4)
EOSINOPHIL NFR BLD AUTO: 1.1 % (ref 0.3–6.2)
ERYTHROCYTE [DISTWIDTH] IN BLOOD BY AUTOMATED COUNT: 12.2 % (ref 12.3–15.4)
ERYTHROCYTE [SEDIMENTATION RATE] IN BLOOD: 4 MM/HR (ref 0–15)
GLOBULIN UR ELPH-MCNC: 2.7 GM/DL
GLUCOSE SERPL-MCNC: 93 MG/DL (ref 65–99)
HCT VFR BLD AUTO: 44.1 % (ref 37.5–51)
HGB BLD-MCNC: 14.9 G/DL (ref 13–17.7)
IMM GRANULOCYTES # BLD AUTO: 0.02 10*3/MM3 (ref 0–0.05)
IMM GRANULOCYTES NFR BLD AUTO: 0.3 % (ref 0–0.5)
LIPASE SERPL-CCNC: 31 U/L (ref 13–60)
LYMPHOCYTES # BLD AUTO: 2.34 10*3/MM3 (ref 0.7–3.1)
LYMPHOCYTES NFR BLD AUTO: 32.4 % (ref 19.6–45.3)
MCH RBC QN AUTO: 29.8 PG (ref 26.6–33)
MCHC RBC AUTO-ENTMCNC: 33.8 G/DL (ref 31.5–35.7)
MCV RBC AUTO: 88.2 FL (ref 79–97)
MONOCYTES # BLD AUTO: 0.6 10*3/MM3 (ref 0.1–0.9)
MONOCYTES NFR BLD AUTO: 8.3 % (ref 5–12)
NEUTROPHILS NFR BLD AUTO: 4.17 10*3/MM3 (ref 1.7–7)
NEUTROPHILS NFR BLD AUTO: 57.6 % (ref 42.7–76)
NRBC BLD AUTO-RTO: 0 /100 WBC (ref 0–0.2)
PLATELET # BLD AUTO: 290 10*3/MM3 (ref 140–450)
PMV BLD AUTO: 8.7 FL (ref 6–12)
POTASSIUM SERPL-SCNC: 3.9 MMOL/L (ref 3.5–5.2)
PROT SERPL-MCNC: 7 G/DL (ref 6–8.5)
RBC # BLD AUTO: 5 10*6/MM3 (ref 4.14–5.8)
SODIUM SERPL-SCNC: 140 MMOL/L (ref 136–145)
WBC NRBC COR # BLD AUTO: 7.23 10*3/MM3 (ref 3.4–10.8)

## 2025-07-29 PROCEDURE — 1125F AMNT PAIN NOTED PAIN PRSNT: CPT | Performed by: PHYSICIAN ASSISTANT

## 2025-07-29 PROCEDURE — 99214 OFFICE O/P EST MOD 30 MIN: CPT | Performed by: PHYSICIAN ASSISTANT

## 2025-07-29 PROCEDURE — 83690 ASSAY OF LIPASE: CPT

## 2025-07-29 PROCEDURE — 80053 COMPREHEN METABOLIC PANEL: CPT

## 2025-07-29 PROCEDURE — 86231 EMA EACH IG CLASS: CPT

## 2025-07-29 PROCEDURE — 85025 COMPLETE CBC W/AUTO DIFF WBC: CPT

## 2025-07-29 PROCEDURE — 85652 RBC SED RATE AUTOMATED: CPT

## 2025-07-29 PROCEDURE — 82784 ASSAY IGA/IGD/IGG/IGM EACH: CPT

## 2025-07-29 PROCEDURE — 86364 TISS TRNSGLTMNASE EA IG CLAS: CPT

## 2025-07-29 NOTE — PROGRESS NOTES
"    Chief Complaint   Patient presents with    Diarrhea     Still hasn't went away and has some concerns        HPI     Tyrone Sanchez is a pleasant 45 y.o. male who presents for evaluation of \"chief complaint.\"        He reports feeling well overall, with the exception of experiencing green-colored diarrhea. This symptom has been present for several weeks and occurs multiple times daily. He does not experience any associated fever, nausea, vomiting, or other symptoms. He also reports no presence of blood in his stool. He experiences gas and rumbling sensations in his abdomen, which temporarily subside after a bowel movement. He recalls having a brief fever when the diarrhea first started, lasting only 3 to 4 hours. He has a history of cannabinoid hyperemesis syndrome but has abstained from smoking for the past 2 years and has not had any related issues since then. He also has a mild umbilical hernia, which does not cause him pain. He recently returned from a vacation in [place] but did not consume any unusual or undercooked food. He is not currently taking any medications and has not taken the azithromycin prescribed by Dr. Edwards for these symptoms at his last visit on 7/11. He had testing ordered at that time but it has not been completed. He has no history of abdominal surgeries and had not taken any antibiotics prior to the onset of his symptoms. He does not experience pain after eating or heartburn. His girlfriend had H. Pylori.     SOCIAL HISTORY  He works as a . He has not smoked in 2 years.    FAMILY HISTORY  His father has a history of IBS and gluten intolerance.       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 No pertinent surgical history.    Family History   Problem Relation Age of Onset    Diabetes Maternal Grandfather     Cancer Paternal Grandfather         Pancreatic       Social History     Socioeconomic " History    Marital status: Single   Tobacco Use    Smoking status: Former     Current packs/day: 0.00     Average packs/day: 1 pack/day for 20.0 years (20.0 ttl pk-yrs)     Types: Cigarettes     Start date: 2002     Quit date: 2017     Years since quittin.0     Passive exposure: Never    Smokeless tobacco: Never   Vaping Use    Vaping status: Some Days    Substances: Nicotine    Devices: Refillable tank    Passive vaping exposure: Yes   Substance and Sexual Activity    Alcohol use: Yes     Alcohol/week: 2.0 standard drinks of alcohol     Types: 2 Cans of beer per week     Comment: week    Drug use: Yes     Types: Marijuana    Sexual activity: Yes       Allergies   Allergen Reactions    Penicillins Anaphylaxis       ROS    Review of Systems   Constitutional:  Negative for diaphoresis and fever.   Gastrointestinal:  Positive for diarrhea. Negative for abdominal pain, blood in stool, nausea, vomiting and GERD.       Vitals:    25 1107   BP: 120/78   Pulse: 70   SpO2: 99%     Body mass index is 25.46 kg/m².      Current Outpatient Medications:     azithromycin (ZITHROMAX) 500 MG tablet, Take 2 tablets by mouth once for one dose. If symptoms persist, take 1 tab PO on day 2 and again on day 3. (Patient not taking: Reported on 2025), Disp: 4 tablet, Rfl: 0    PE    Physical Exam  Vitals reviewed.   Constitutional:       General: He is not in acute distress.     Appearance: He is well-developed.   HENT:      Head: Normocephalic and atraumatic.   Eyes:      Conjunctiva/sclera: Conjunctivae normal.   Cardiovascular:      Rate and Rhythm: Normal rate and regular rhythm.      Heart sounds: Normal heart sounds. No murmur heard.  Pulmonary:      Effort: Pulmonary effort is normal.      Breath sounds: Normal breath sounds.   Abdominal:      General: Bowel sounds are normal.      Tenderness: There is no abdominal tenderness.   Musculoskeletal:      Cervical back: Normal range of motion.   Skin:     General:  Skin is warm and dry.   Neurological:      Mental Status: He is alert.      Gait: Gait normal.   Psychiatric:         Speech: Speech normal.         Behavior: Behavior normal.          A/P    Problem List Items Addressed This Visit    None  Visit Diagnoses         Diarrhea of presumed infectious origin    -  Primary    Relevant Orders    Gastrointestinal Panel, PCR - Stool, Per Rectum    H. Pylori Antigen, Stool - Stool, Per Rectum    CBC & Differential    Comprehensive Metabolic Panel    Lipase    Sedimentation rate, automated    Celiac Disease Panel    Clostridioides difficile Toxin - Stool, Per Rectum          -Benign abdominal exam today.  -Check labs and stool studies as ordered. Counseled the patient to go ahead and take azithromycin after he completes testing.  Recommended he take Imodium over-the-counter as needed for diarrhea, increase his fluid and electrolyte intake to stay hydrated.  He was counseled to return if symptoms worsen.  If testing is unremarkable, plan for referral to gastroenterology for a colonoscopy.    Plan of care was reviewed with patient at the conclusion of today's visit. Education was provided regarding diagnoses, management, prescribed or recommended OTC products, and the importance of compliance with follow-up appointments. The patient was counseled regarding the risks, benefits, and possible side-effects of treatment. I advised the patient to keep me informed of any acute changes in their status including new, worsening, or persistent symptoms. Patient expresses understanding and agreement with the management plan.        Quinten Coats PA-C

## 2025-07-31 ENCOUNTER — TELEPHONE (OUTPATIENT)
Dept: FAMILY MEDICINE CLINIC | Facility: CLINIC | Age: 45
End: 2025-07-31
Payer: MEDICAID

## 2025-07-31 ENCOUNTER — LAB (OUTPATIENT)
Dept: LAB | Facility: HOSPITAL | Age: 45
End: 2025-07-31
Payer: MEDICAID

## 2025-07-31 DIAGNOSIS — R19.7 DIARRHEA OF PRESUMED INFECTIOUS ORIGIN: ICD-10-CM

## 2025-07-31 LAB
ADV 40+41 DNA STL QL NAA+NON-PROBE: NOT DETECTED
ASTRO TYP 1-8 RNA STL QL NAA+NON-PROBE: NOT DETECTED
C CAYETANENSIS DNA STL QL NAA+NON-PROBE: DETECTED
C COLI+JEJ+UPSA DNA STL QL NAA+NON-PROBE: NOT DETECTED
CRYPTOSP DNA STL QL NAA+NON-PROBE: NOT DETECTED
E HISTOLYT DNA STL QL NAA+NON-PROBE: NOT DETECTED
EAEC PAA PLAS AGGR+AATA ST NAA+NON-PRB: NOT DETECTED
EC STX1+STX2 GENES STL QL NAA+NON-PROBE: NOT DETECTED
ENDOMYSIUM IGA SER QL: NEGATIVE
EPEC EAE GENE STL QL NAA+NON-PROBE: NOT DETECTED
ETEC LTA+ST1A+ST1B TOX ST NAA+NON-PROBE: NOT DETECTED
G LAMBLIA DNA STL QL NAA+NON-PROBE: NOT DETECTED
IGA SERPL-MCNC: 284 MG/DL (ref 90–386)
NOROVIRUS GI+II RNA STL QL NAA+NON-PROBE: NOT DETECTED
P SHIGELLOIDES DNA STL QL NAA+NON-PROBE: NOT DETECTED
RVA RNA STL QL NAA+NON-PROBE: NOT DETECTED
S ENT+BONG DNA STL QL NAA+NON-PROBE: NOT DETECTED
SAPO I+II+IV+V RNA STL QL NAA+NON-PROBE: NOT DETECTED
SHIGELLA SP+EIEC IPAH ST NAA+NON-PROBE: NOT DETECTED
TTG IGA SER-ACNC: <2 U/ML (ref 0–3)
V CHOL+PARA+VUL DNA STL QL NAA+NON-PROBE: NOT DETECTED
V CHOLERAE DNA STL QL NAA+NON-PROBE: NOT DETECTED
Y ENTEROCOL DNA STL QL NAA+NON-PROBE: NOT DETECTED

## 2025-07-31 PROCEDURE — 87507 IADNA-DNA/RNA PROBE TQ 12-25: CPT

## 2025-08-04 ENCOUNTER — LAB (OUTPATIENT)
Dept: LAB | Facility: HOSPITAL | Age: 45
End: 2025-08-04
Payer: MEDICAID

## 2025-08-04 ENCOUNTER — RESULTS FOLLOW-UP (OUTPATIENT)
Dept: FAMILY MEDICINE CLINIC | Facility: CLINIC | Age: 45
End: 2025-08-04
Payer: MEDICAID

## 2025-08-04 DIAGNOSIS — R19.7 DIARRHEA OF PRESUMED INFECTIOUS ORIGIN: ICD-10-CM

## 2025-08-04 LAB — C DIFF TOX GENS STL QL NAA+PROBE: NOT DETECTED

## 2025-08-04 PROCEDURE — 87338 HPYLORI STOOL AG IA: CPT

## 2025-08-04 PROCEDURE — 87493 C DIFF AMPLIFIED PROBE: CPT

## 2025-08-04 RX ORDER — SULFAMETHOXAZOLE AND TRIMETHOPRIM 800; 160 MG/1; MG/1
1 TABLET ORAL 2 TIMES DAILY
Qty: 14 TABLET | Refills: 0 | Status: SHIPPED | OUTPATIENT
Start: 2025-08-04 | End: 2025-08-11

## 2025-08-05 LAB — H PYLORI AG STL QL IA: NEGATIVE
